# Patient Record
Sex: MALE | Race: WHITE | URBAN - METROPOLITAN AREA
[De-identification: names, ages, dates, MRNs, and addresses within clinical notes are randomized per-mention and may not be internally consistent; named-entity substitution may affect disease eponyms.]

---

## 2020-07-15 ENCOUNTER — INPATIENT (INPATIENT)
Facility: HOSPITAL | Age: 40
LOS: 1 days | Discharge: HOME | DRG: 637 | End: 2020-07-17
Attending: NURSE PRACTITIONER | Admitting: INTERNAL MEDICINE
Payer: COMMERCIAL

## 2020-07-15 VITALS
DIASTOLIC BLOOD PRESSURE: 80 MMHG | TEMPERATURE: 99 F | SYSTOLIC BLOOD PRESSURE: 132 MMHG | OXYGEN SATURATION: 100 % | HEIGHT: 69 IN | WEIGHT: 134.92 LBS | HEART RATE: 83 BPM

## 2020-07-15 DIAGNOSIS — Z90.89 ACQUIRED ABSENCE OF OTHER ORGANS: Chronic | ICD-10-CM

## 2020-07-15 DIAGNOSIS — E11.9 TYPE 2 DIABETES MELLITUS WITHOUT COMPLICATIONS: ICD-10-CM

## 2020-07-15 LAB
ACETONE SERPL-MCNC: ABNORMAL
ALBUMIN SERPL ELPH-MCNC: 3.4 G/DL — SIGNIFICANT CHANGE UP (ref 3.3–5)
ALP SERPL-CCNC: 139 U/L — HIGH (ref 40–120)
ALT FLD-CCNC: 72 U/L — SIGNIFICANT CHANGE UP (ref 12–78)
ANION GAP SERPL CALC-SCNC: 8 MMOL/L — SIGNIFICANT CHANGE UP (ref 5–17)
APPEARANCE UR: CLEAR — SIGNIFICANT CHANGE UP
APTT BLD: 25.3 SEC — LOW (ref 27.5–35.5)
AST SERPL-CCNC: 26 U/L — SIGNIFICANT CHANGE UP (ref 15–37)
BASE EXCESS BLDV CALC-SCNC: -1.5 MMOL/L — SIGNIFICANT CHANGE UP (ref -2–2)
BASOPHILS # BLD AUTO: 0.06 K/UL — SIGNIFICANT CHANGE UP (ref 0–0.2)
BASOPHILS NFR BLD AUTO: 0.9 % — SIGNIFICANT CHANGE UP (ref 0–2)
BILIRUB SERPL-MCNC: 0.5 MG/DL — SIGNIFICANT CHANGE UP (ref 0.2–1.2)
BILIRUB UR-MCNC: NEGATIVE — SIGNIFICANT CHANGE UP
BUN SERPL-MCNC: 20 MG/DL — SIGNIFICANT CHANGE UP (ref 7–23)
CALCIUM SERPL-MCNC: 8.5 MG/DL — SIGNIFICANT CHANGE UP (ref 8.5–10.1)
CHLORIDE SERPL-SCNC: 97 MMOL/L — SIGNIFICANT CHANGE UP (ref 96–108)
CO2 SERPL-SCNC: 24 MMOL/L — SIGNIFICANT CHANGE UP (ref 22–31)
COLOR SPEC: YELLOW — SIGNIFICANT CHANGE UP
CREAT SERPL-MCNC: 0.95 MG/DL — SIGNIFICANT CHANGE UP (ref 0.5–1.3)
DIFF PNL FLD: NEGATIVE — SIGNIFICANT CHANGE UP
EOSINOPHIL # BLD AUTO: 0.11 K/UL — SIGNIFICANT CHANGE UP (ref 0–0.5)
EOSINOPHIL NFR BLD AUTO: 1.6 % — SIGNIFICANT CHANGE UP (ref 0–6)
GLUCOSE SERPL-MCNC: 590 MG/DL — CRITICAL HIGH (ref 70–99)
GLUCOSE UR QL: 1000 MG/DL
HCO3 BLDV-SCNC: 24 MMOL/L — SIGNIFICANT CHANGE UP (ref 21–29)
HCT VFR BLD CALC: 37.3 % — LOW (ref 39–50)
HGB BLD-MCNC: 13.3 G/DL — SIGNIFICANT CHANGE UP (ref 13–17)
IMM GRANULOCYTES NFR BLD AUTO: 0.3 % — SIGNIFICANT CHANGE UP (ref 0–1.5)
INR BLD: 0.83 RATIO — LOW (ref 0.88–1.16)
KETONES UR-MCNC: ABNORMAL
LEUKOCYTE ESTERASE UR-ACNC: NEGATIVE — SIGNIFICANT CHANGE UP
LYMPHOCYTES # BLD AUTO: 1.72 K/UL — SIGNIFICANT CHANGE UP (ref 1–3.3)
LYMPHOCYTES # BLD AUTO: 25.7 % — SIGNIFICANT CHANGE UP (ref 13–44)
MCHC RBC-ENTMCNC: 29.8 PG — SIGNIFICANT CHANGE UP (ref 27–34)
MCHC RBC-ENTMCNC: 35.7 GM/DL — SIGNIFICANT CHANGE UP (ref 32–36)
MCV RBC AUTO: 83.6 FL — SIGNIFICANT CHANGE UP (ref 80–100)
MONOCYTES # BLD AUTO: 0.34 K/UL — SIGNIFICANT CHANGE UP (ref 0–0.9)
MONOCYTES NFR BLD AUTO: 5.1 % — SIGNIFICANT CHANGE UP (ref 2–14)
NEUTROPHILS # BLD AUTO: 4.43 K/UL — SIGNIFICANT CHANGE UP (ref 1.8–7.4)
NEUTROPHILS NFR BLD AUTO: 66.4 % — SIGNIFICANT CHANGE UP (ref 43–77)
NITRITE UR-MCNC: NEGATIVE — SIGNIFICANT CHANGE UP
PCO2 BLDV: 44 MMHG — SIGNIFICANT CHANGE UP (ref 35–50)
PH BLDV: 7.35 — SIGNIFICANT CHANGE UP (ref 7.35–7.45)
PH UR: 5 — SIGNIFICANT CHANGE UP (ref 5–8)
PLATELET # BLD AUTO: 294 K/UL — SIGNIFICANT CHANGE UP (ref 150–400)
PO2 BLDV: 60 MMHG — HIGH (ref 25–45)
POTASSIUM SERPL-MCNC: 4.1 MMOL/L — SIGNIFICANT CHANGE UP (ref 3.5–5.3)
POTASSIUM SERPL-SCNC: 4.1 MMOL/L — SIGNIFICANT CHANGE UP (ref 3.5–5.3)
PROT SERPL-MCNC: 6.4 GM/DL — SIGNIFICANT CHANGE UP (ref 6–8.3)
PROT UR-MCNC: NEGATIVE MG/DL — SIGNIFICANT CHANGE UP
PROTHROM AB SERPL-ACNC: 9.8 SEC — LOW (ref 10.6–13.6)
RBC # BLD: 4.46 M/UL — SIGNIFICANT CHANGE UP (ref 4.2–5.8)
RBC # FLD: 11.9 % — SIGNIFICANT CHANGE UP (ref 10.3–14.5)
SAO2 % BLDV: 91 % — HIGH (ref 67–88)
SODIUM SERPL-SCNC: 129 MMOL/L — LOW (ref 135–145)
SP GR SPEC: 1.01 — SIGNIFICANT CHANGE UP (ref 1.01–1.02)
UROBILINOGEN FLD QL: NEGATIVE MG/DL — SIGNIFICANT CHANGE UP
WBC # BLD: 6.68 K/UL — SIGNIFICANT CHANGE UP (ref 3.8–10.5)
WBC # FLD AUTO: 6.68 K/UL — SIGNIFICANT CHANGE UP (ref 3.8–10.5)

## 2020-07-15 PROCEDURE — 84100 ASSAY OF PHOSPHORUS: CPT

## 2020-07-15 PROCEDURE — 71045 X-RAY EXAM CHEST 1 VIEW: CPT | Mod: 26

## 2020-07-15 PROCEDURE — 86341 ISLET CELL ANTIBODY: CPT

## 2020-07-15 PROCEDURE — 83036 HEMOGLOBIN GLYCOSYLATED A1C: CPT

## 2020-07-15 PROCEDURE — 82962 GLUCOSE BLOOD TEST: CPT

## 2020-07-15 PROCEDURE — 84681 ASSAY OF C-PEPTIDE: CPT

## 2020-07-15 PROCEDURE — 36415 COLL VENOUS BLD VENIPUNCTURE: CPT

## 2020-07-15 PROCEDURE — 83519 RIA NONANTIBODY: CPT

## 2020-07-15 PROCEDURE — 80053 COMPREHEN METABOLIC PANEL: CPT

## 2020-07-15 PROCEDURE — 83735 ASSAY OF MAGNESIUM: CPT

## 2020-07-15 PROCEDURE — 85027 COMPLETE CBC AUTOMATED: CPT

## 2020-07-15 PROCEDURE — 99222 1ST HOSP IP/OBS MODERATE 55: CPT | Mod: AI

## 2020-07-15 PROCEDURE — 93010 ELECTROCARDIOGRAM REPORT: CPT

## 2020-07-15 PROCEDURE — 80048 BASIC METABOLIC PNL TOTAL CA: CPT

## 2020-07-15 PROCEDURE — 80061 LIPID PANEL: CPT

## 2020-07-15 RX ORDER — DEXTROSE 50 % IN WATER 50 %
15 SYRINGE (ML) INTRAVENOUS ONCE
Refills: 0 | Status: DISCONTINUED | OUTPATIENT
Start: 2020-07-15 | End: 2020-07-17

## 2020-07-15 RX ORDER — INSULIN LISPRO 100/ML
VIAL (ML) SUBCUTANEOUS AT BEDTIME
Refills: 0 | Status: DISCONTINUED | OUTPATIENT
Start: 2020-07-15 | End: 2020-07-17

## 2020-07-15 RX ORDER — DEXTROSE 50 % IN WATER 50 %
12.5 SYRINGE (ML) INTRAVENOUS ONCE
Refills: 0 | Status: DISCONTINUED | OUTPATIENT
Start: 2020-07-15 | End: 2020-07-17

## 2020-07-15 RX ORDER — SODIUM CHLORIDE 9 MG/ML
1000 INJECTION INTRAMUSCULAR; INTRAVENOUS; SUBCUTANEOUS
Refills: 0 | Status: DISCONTINUED | OUTPATIENT
Start: 2020-07-15 | End: 2020-07-17

## 2020-07-15 RX ORDER — INSULIN LISPRO 100/ML
VIAL (ML) SUBCUTANEOUS
Refills: 0 | Status: DISCONTINUED | OUTPATIENT
Start: 2020-07-15 | End: 2020-07-17

## 2020-07-15 RX ORDER — DEXTROSE 50 % IN WATER 50 %
25 SYRINGE (ML) INTRAVENOUS ONCE
Refills: 0 | Status: DISCONTINUED | OUTPATIENT
Start: 2020-07-15 | End: 2020-07-17

## 2020-07-15 RX ORDER — INSULIN GLARGINE 100 [IU]/ML
15 INJECTION, SOLUTION SUBCUTANEOUS AT BEDTIME
Refills: 0 | Status: DISCONTINUED | OUTPATIENT
Start: 2020-07-15 | End: 2020-07-17

## 2020-07-15 RX ORDER — BUPROPION HYDROCHLORIDE 150 MG/1
300 TABLET, EXTENDED RELEASE ORAL DAILY
Refills: 0 | Status: DISCONTINUED | OUTPATIENT
Start: 2020-07-15 | End: 2020-07-15

## 2020-07-15 RX ORDER — BUPROPION HYDROCHLORIDE 150 MG/1
1 TABLET, EXTENDED RELEASE ORAL
Qty: 0 | Refills: 0 | DISCHARGE

## 2020-07-15 RX ORDER — BUPROPION HYDROCHLORIDE 150 MG/1
300 TABLET, EXTENDED RELEASE ORAL AT BEDTIME
Refills: 0 | Status: DISCONTINUED | OUTPATIENT
Start: 2020-07-15 | End: 2020-07-16

## 2020-07-15 RX ORDER — SODIUM CHLORIDE 9 MG/ML
2000 INJECTION INTRAMUSCULAR; INTRAVENOUS; SUBCUTANEOUS ONCE
Refills: 0 | Status: COMPLETED | OUTPATIENT
Start: 2020-07-15 | End: 2020-07-15

## 2020-07-15 RX ORDER — INSULIN LISPRO 100/ML
7 VIAL (ML) SUBCUTANEOUS ONCE
Refills: 0 | Status: COMPLETED | OUTPATIENT
Start: 2020-07-15 | End: 2020-07-15

## 2020-07-15 RX ORDER — INSULIN LISPRO 100/ML
5 VIAL (ML) SUBCUTANEOUS
Refills: 0 | Status: DISCONTINUED | OUTPATIENT
Start: 2020-07-15 | End: 2020-07-17

## 2020-07-15 RX ORDER — QUETIAPINE FUMARATE 200 MG/1
100 TABLET, FILM COATED ORAL AT BEDTIME
Refills: 0 | Status: DISCONTINUED | OUTPATIENT
Start: 2020-07-15 | End: 2020-07-17

## 2020-07-15 RX ORDER — GLUCAGON INJECTION, SOLUTION 0.5 MG/.1ML
1 INJECTION, SOLUTION SUBCUTANEOUS ONCE
Refills: 0 | Status: DISCONTINUED | OUTPATIENT
Start: 2020-07-15 | End: 2020-07-17

## 2020-07-15 RX ADMIN — INSULIN GLARGINE 15 UNIT(S): 100 INJECTION, SOLUTION SUBCUTANEOUS at 23:20

## 2020-07-15 RX ADMIN — SODIUM CHLORIDE 100 MILLILITER(S): 9 INJECTION INTRAMUSCULAR; INTRAVENOUS; SUBCUTANEOUS at 22:52

## 2020-07-15 RX ADMIN — Medication 7 UNIT(S): at 20:01

## 2020-07-15 RX ADMIN — QUETIAPINE FUMARATE 100 MILLIGRAM(S): 200 TABLET, FILM COATED ORAL at 22:51

## 2020-07-15 RX ADMIN — SODIUM CHLORIDE 1000 MILLILITER(S): 9 INJECTION INTRAMUSCULAR; INTRAVENOUS; SUBCUTANEOUS at 18:45

## 2020-07-15 NOTE — ED STATDOCS - PROGRESS NOTE DETAILS
Tom DUNCAN for Dr. Nichole: 40 y/o male with a PMHx of Anxiety, Depression presents to the ED for hyperglycemia. In triage, finger stick is over 600. No hx of DM. Works for [employer] and 2 days ago felt signs of "heat exhaustion" with fatigue and excessive thirst. Yesterday had urinary frequency. +nausea x today. Went to , had glucose of 60 in urine and had ketones of 8. Pt lost 30 pounds in 2.5 months. Denies vomiting, diarrhea. Will send pt to main ED for further evaluation.

## 2020-07-15 NOTE — ED PROVIDER NOTE - CLINICAL SUMMARY MEDICAL DECISION MAKING FREE TEXT BOX
Hyperglycemia without DKA.  Given IVF, insulin.  Pt without PCP currently.  Admit to medicine service.

## 2020-07-15 NOTE — H&P ADULT - NSHPSOCIALHISTORY_GEN_ALL_CORE
Former smoker, 20 years, stopped 4 months ago.  Occasional binge drinking history, none recently.  No drug use.  Lives with his wife and son.  Works at [employer].

## 2020-07-15 NOTE — ED ADULT NURSE NOTE - OBJECTIVE STATEMENT
Pt to ED for hyperglycemia. In triage, finger stick is over 600. No hx of DM. Works for [employer] and 2 days ago felt signs of "heat exhaustion" with fatigue and excessive thirst. Yesterday had urinary frequency. +nausea x today. Went to UC, had glucose of 60 in urine and had ketones of 8. Pt lost 30 pounds in 2.5 months.

## 2020-07-15 NOTE — H&P ADULT - NSHPLABSRESULTS_GEN_ALL_CORE
Labs personally reviewed and interpreted. Notable for no leukocytosis (WBC 6.68), left shift, or lymphopenia. Hb 13.3, plt 294. INR 0.83, Na low at 129 due to pseudohyponatremia. K 4.1, Cl 97, HCO3 24, BUN/creatinine 20/0.95 (no prior baseline). BG elevated at 590. AG normal at 8. Alk phos minimally elevated at 139. AST/ALT normal at 26/72. VBG pH 7.35 with pCO2 44 and pO2 60. Small serum acetone.  UA shows SG 1.010, moderate ketones, and 1000 glucose.  COVID-19 PCR results pending.    CXR personally reviewed and interpreted. Notable for clear lungs, no focal consolidations, effusions, interstitial  markings, pneumothorax, or obvious cardiomegaly.    EKG personally reviewed. Normal sinus rhythm, normal axis. No pathological Q waves or ST changes. Rate 74, , QTc 459.

## 2020-07-15 NOTE — ED PROVIDER NOTE - OBJECTIVE STATEMENT
40 y/o male with a PMHx of Anxiety, Depression presents to the ED for hyperglycemia. In triage, finger stick is over 600. No hx of DM. Works for [employer] and 2 days ago felt signs of "heat exhaustion" with fatigue and excessive thirst. Yesterday had urinary frequency. +nausea x today. Went to , had glucose of 60 in urine and had ketones of 8. Pt lost 30 pounds in 2.5 months. Denies vomiting, diarrhea. Will send pt to main ED for further evaluation.

## 2020-07-15 NOTE — H&P ADULT - HISTORY OF PRESENT ILLNESS
38 yo M with a PMH anxiety with depression who presents with multiple complaints. He has been feeling very thirsty with polyuria and a 30 pound weight loss for the past 3 months. He has had weakness and myalgias and has had difficulty and fatigue lifting things at work. ROS is also positive for an episode of chills a week ago, and some nausea, but otherwise is negative. He stopped his Wellbutrin and Seroquel 2 days ago because he thought his symptoms might be medication induced. He went to Urgent Care today and was found to have glucose in his urine, with concern for diabetes, and was recommended to come to the ED.  The patient acknowledges a poor diet, with little vegetable intake, eating lots of candy, and soda use (2L at dinner on occasion, although has stopped soda recently). Of note, he also was started on Seroquel about 4 months ago.    In the ED, he was given 2L NS x1 and Humalog 7 units SQ x1.

## 2020-07-15 NOTE — ED ADULT NURSE NOTE - CHPI ED NUR SYMPTOMS NEG
no tingling/no chills/no vomiting/no fever/no weakness/no decreased eating/drinking/no pain/no nausea/no dizziness

## 2020-07-15 NOTE — H&P ADULT - ASSESSMENT
40 yo M with a PMH anxiety with depression who presents with multiple complaints, found to have new onset diabetes mellitus.    1) New onset diabetes mellitus  - Likely due to poor diet, although may be complicated by Seroquel use  - With pseudohyponatremia due to elevated BG  - With slight serum acetone but no significant evidence of DKA  - _________  - Check A1C and lipid panel  - Endocrinology consult  - Discussed with patient that when he is discharged, he may go home on insulin vs PO medication and that he would be educated appropriately    2) Anxiety with depression  - Will continue Bupropion 300 mg QHS and Quetiapine 100 mg QHS  - Discussed how patient should talk with his psychiatrist about possibly changing Quetiapine in light of new diabetes    3) Prophylactic measure  - DVT PPX: IMPROVE score of 0, low risk, no pharmacological PPX needed, will give SCDs  - Diet: consistent carb  - Dispo: pending sugar control and endocrinology matthias 40 yo M with a PMH anxiety with depression who presents with multiple complaints, found to have new onset uncontrolled diabetes mellitus.    1) Uncontrolled Diabetes Mellitus  - New onset diabetes mellitus. Likely due to poor diet, although may be complicated by Seroquel use  - With pseudohyponatremia due to elevated BG  - With slight serum acetone but no significant evidence of DKA  - S/p 7 units Humalog  - Please obtain weight and will provide weight based insulin dosing with basal/bolus for now  - Check FS Q4 hours until FS < 300 x2, then switch to QAC + HS, with moderate SSI  - Check A1C and lipid panel  - Endocrinology consult  - Discussed with patient that when he is discharged, he may go home on insulin vs PO medication and that he would be educated appropriately    2) Anxiety with depression  - Will continue Bupropion 300 mg QHS and Quetiapine 100 mg QHS  - Discussed how patient should talk with his psychiatrist about possibly changing Quetiapine in light of new diabetes    3) Prophylactic measure  - DVT PPX: IMPROVE score of 0, low risk, no pharmacological PPX needed, will give SCDs  - Diet: consistent carb  - Dispo: pending sugar control and endocrinology matthias 38 yo M with a PMH anxiety with depression who presents with multiple complaints, found to have new onset uncontrolled diabetes mellitus.    1) Uncontrolled Diabetes Mellitus  - New onset diabetes mellitus. Likely due to poor diet, although may be complicated by recent Seroquel use  - With pseudohyponatremia due to elevated BG  - With slight serum acetone but no significant evidence of DKA  - S/p 7 units Humalog  - Will give weight based basal/bolus dosing with Lantus 15 units QHS, and standing premeal Humalog 5 units QAC  - Check FS Q4 hours until FS < 300 x2, then switch to QAC + HS, with moderate SSI  - Check A1C and lipid panel  - Endocrinology consult  - Discussed with patient that when he is discharged, he may go home on insulin vs PO medication and that he would be educated appropriately    2) Anxiety with depression  - Will continue Bupropion 300 mg QHS and Quetiapine 100 mg QHS  - Discussed how patient should talk with his psychiatrist about possibly changing Quetiapine in light of new diabetes    3) Prophylactic measure  - DVT PPX: IMPROVE score of 0, low risk, no pharmacological PPX needed, will give SCDs  - Diet: consistent carb  - Dispo: pending sugar control and endocrinology matthias 40 yo M with a PMH anxiety with depression who presents with multiple complaints, found to have new onset uncontrolled diabetes mellitus.    1) Uncontrolled Diabetes Mellitus  - New onset diabetes mellitus. Likely due to poor diet, although may be complicated by recent Seroquel use  - With pseudohyponatremia due to elevated BG  - With slight serum acetone but no significant evidence of DKA  - S/p 7 units Humalog  - Will give weight based basal/bolus dosing with Lantus 15 units QHS, and standing premeal Humalog 5 units QAC  - Check FS Q4 hours until FS < 300 x2, then switch to QAC + HS, with moderate SSI  - IV hydration  - Check A1C and lipid panel  - Endocrinology consult  - Discussed with patient that when he is discharged, he may go home on insulin vs PO medication and that he would be educated appropriately    2) Anxiety with depression  - Will continue Bupropion 300 mg QHS and Quetiapine 100 mg QHS  - Discussed how patient should talk with his psychiatrist about possibly changing Quetiapine in light of new diabetes    3) Prophylactic measure  - DVT PPX: IMPROVE score of 0, low risk, no pharmacological PPX needed, will give SCDs  - Diet: consistent carb  - Dispo: pending sugar control and endocrinology matthias

## 2020-07-15 NOTE — ED ADULT NURSE NOTE - NSIMPLEMENTINTERV_GEN_ALL_ED
Implemented All Universal Safety Interventions:  Hannah to call system. Call bell, personal items and telephone within reach. Instruct patient to call for assistance. Room bathroom lighting operational. Non-slip footwear when patient is off stretcher. Physically safe environment: no spills, clutter or unnecessary equipment. Stretcher in lowest position, wheels locked, appropriate side rails in place.

## 2020-07-15 NOTE — H&P ADULT - NSHPPHYSICALEXAM_GEN_ALL_CORE
Vital Signs Last 24 Hrs  T(C): 36.8 (15 Jul 2020 19:48), Max: 37.2 (15 Jul 2020 17:47)  T(F): 98.3 (15 Jul 2020 19:48), Max: 98.9 (15 Jul 2020 17:47)  HR: 65 (15 Jul 2020 19:48) (65 - 83)  BP: 119/73 (15 Jul 2020 19:48) (119/73 - 132/80)  BP(mean): 87 (15 Jul 2020 19:48) (87 - 95)  RR: 18 (15 Jul 2020 19:48) (18 - 18)  SpO2: 100% (15 Jul 2020 19:48) (100% - 100%)    GENERAL: No acute distress, borderline cachectic appearing  HEENT: PERRL, EOMI, MM very dry, no oropharyngeal lesions  NECK: supple, no stiffness, no JVD, no thyromegaly  PULM: respirations non-labored, clear to auscultation bilaterally, no rales, rhonchi, or wheezes  CV: regular rate and rhythm, no murmurs, gallops, or rubs  GI: abdomen soft, nontender, nondistended, no masses felt, normal bowel sounds  MSK: strength 5/5 bilateral upper/lower extremities. No joint swelling, erythema, or warmth.  LYMPH: no anterior cervical, posterior cervical, supraclavicular, or inguinal lymphadenopathy  NEURO: A&Ox3, no tremors, sensation intact  SKIN: no rashes, lesions, or edema

## 2020-07-15 NOTE — H&P ADULT - NSHPREVIEWOFSYSTEMS_GEN_ALL_CORE
Gen: + chills, weight loss, malaise, fatigue. Negative for fevers or weight gain  Eyes: no blurred vision or lacrimation  ENT: no tinnitus, vertigo, or decreased hearing  Resp: no wheezing, dyspnea, pleuritic chest pain, hemoptysis, or orthopnea  CV: + FRY. No chest pain or palpitations  GI: + nausea. No vomiting, abdominal pain, diarrhea, constipation, melena, or hematochezia  : no dysuria, hematuria, or incontinence  MSK: + myalgias. No arthralgias or joint swelling  Neuro: + weakness. No focal deficits, confusion, tremors, or seizures  Skin: no rash, lesions, or edema

## 2020-07-16 LAB
ALBUMIN SERPL ELPH-MCNC: 2.9 G/DL — LOW (ref 3.3–5)
ALP SERPL-CCNC: 70 U/L — SIGNIFICANT CHANGE UP (ref 40–120)
ALT FLD-CCNC: 60 U/L — SIGNIFICANT CHANGE UP (ref 12–78)
ANION GAP SERPL CALC-SCNC: 6 MMOL/L — SIGNIFICANT CHANGE UP (ref 5–17)
AST SERPL-CCNC: 27 U/L — SIGNIFICANT CHANGE UP (ref 15–37)
BILIRUB SERPL-MCNC: 0.6 MG/DL — SIGNIFICANT CHANGE UP (ref 0.2–1.2)
BUN SERPL-MCNC: 12 MG/DL — SIGNIFICANT CHANGE UP (ref 7–23)
C PEPTIDE SERPL-MCNC: 0.5 NG/ML — LOW (ref 1.1–4.4)
CALCIUM SERPL-MCNC: 8 MG/DL — LOW (ref 8.5–10.1)
CHLORIDE SERPL-SCNC: 111 MMOL/L — HIGH (ref 96–108)
CHOLEST SERPL-MCNC: 145 MG/DL — SIGNIFICANT CHANGE UP (ref 10–199)
CO2 SERPL-SCNC: 25 MMOL/L — SIGNIFICANT CHANGE UP (ref 22–31)
CREAT SERPL-MCNC: 0.7 MG/DL — SIGNIFICANT CHANGE UP (ref 0.5–1.3)
CULTURE RESULTS: NO GROWTH — SIGNIFICANT CHANGE UP
GLUCOSE SERPL-MCNC: 153 MG/DL — HIGH (ref 70–99)
HCT VFR BLD CALC: 37.6 % — LOW (ref 39–50)
HDLC SERPL-MCNC: 36 MG/DL — LOW
HGB BLD-MCNC: 13.3 G/DL — SIGNIFICANT CHANGE UP (ref 13–17)
LIPID PNL WITH DIRECT LDL SERPL: 83 MG/DL — SIGNIFICANT CHANGE UP
MAGNESIUM SERPL-MCNC: 1.9 MG/DL — SIGNIFICANT CHANGE UP (ref 1.6–2.6)
MCHC RBC-ENTMCNC: 30.3 PG — SIGNIFICANT CHANGE UP (ref 27–34)
MCHC RBC-ENTMCNC: 35.4 GM/DL — SIGNIFICANT CHANGE UP (ref 32–36)
MCV RBC AUTO: 85.6 FL — SIGNIFICANT CHANGE UP (ref 80–100)
PHOSPHATE SERPL-MCNC: 2.9 MG/DL — SIGNIFICANT CHANGE UP (ref 2.5–4.5)
PLATELET # BLD AUTO: 271 K/UL — SIGNIFICANT CHANGE UP (ref 150–400)
POTASSIUM SERPL-MCNC: 3.6 MMOL/L — SIGNIFICANT CHANGE UP (ref 3.5–5.3)
POTASSIUM SERPL-SCNC: 3.6 MMOL/L — SIGNIFICANT CHANGE UP (ref 3.5–5.3)
PROT SERPL-MCNC: 5.2 GM/DL — LOW (ref 6–8.3)
RBC # BLD: 4.39 M/UL — SIGNIFICANT CHANGE UP (ref 4.2–5.8)
RBC # FLD: 11.9 % — SIGNIFICANT CHANGE UP (ref 10.3–14.5)
SARS-COV-2 IGG SERPL QL IA: NEGATIVE — SIGNIFICANT CHANGE UP
SARS-COV-2 IGM SERPL IA-ACNC: <0.1 INDEX — SIGNIFICANT CHANGE UP
SARS-COV-2 RNA SPEC QL NAA+PROBE: SIGNIFICANT CHANGE UP
SODIUM SERPL-SCNC: 142 MMOL/L — SIGNIFICANT CHANGE UP (ref 135–145)
SPECIMEN SOURCE: SIGNIFICANT CHANGE UP
TOTAL CHOLESTEROL/HDL RATIO MEASUREMENT: 4.1 RATIO — SIGNIFICANT CHANGE UP (ref 3.4–9.6)
TRIGL SERPL-MCNC: 133 MG/DL — SIGNIFICANT CHANGE UP (ref 10–149)
WBC # BLD: 7.66 K/UL — SIGNIFICANT CHANGE UP (ref 3.8–10.5)
WBC # FLD AUTO: 7.66 K/UL — SIGNIFICANT CHANGE UP (ref 3.8–10.5)

## 2020-07-16 PROCEDURE — 99233 SBSQ HOSP IP/OBS HIGH 50: CPT

## 2020-07-16 RX ORDER — BUPROPION HYDROCHLORIDE 150 MG/1
300 TABLET, EXTENDED RELEASE ORAL DAILY
Refills: 0 | Status: DISCONTINUED | OUTPATIENT
Start: 2020-07-16 | End: 2020-07-17

## 2020-07-16 RX ADMIN — Medication 5 UNIT(S): at 17:03

## 2020-07-16 RX ADMIN — QUETIAPINE FUMARATE 100 MILLIGRAM(S): 200 TABLET, FILM COATED ORAL at 22:08

## 2020-07-16 RX ADMIN — Medication 5 UNIT(S): at 08:32

## 2020-07-16 RX ADMIN — Medication 5 UNIT(S): at 11:59

## 2020-07-16 RX ADMIN — Medication 2: at 08:31

## 2020-07-16 RX ADMIN — Medication 6: at 12:00

## 2020-07-16 RX ADMIN — INSULIN GLARGINE 15 UNIT(S): 100 INJECTION, SOLUTION SUBCUTANEOUS at 22:08

## 2020-07-16 NOTE — DIETITIAN INITIAL EVALUATION ADULT. - OTHER INFO
38 yo M with a PMH anxiety with depression who presents with multiple complaints. He has been feeling very thirsty with polyuria and a 30 pound weight loss for the past 3 months. He has had weakness and myalgias and has had difficulty and fatigue lifting things at work. ROS is also positive for an episode of chills a week ago, and some nausea, but otherwise is negative.  He went to Urgent Care today and was found to have glucose in his urine, with concern for diabetes, and was recommended to come to the ED.  The patient acknowledges a poor diet, with little vegetable intake, eating lots of candy, and soda use (2L at dinner on occasion, although has stopped soda recently). Of note, he also was started on Seroquel about 4 months ago.  ) Uncontrolled Diabetes Mellitus  - New onset diabetes mellitus. Likely due to poor diet, although may be complicated by recent Seroquel use  - With pseudohyponatremia due to elevated BG  - With slight serum acetone but no significant evidence of DKA  - S/p 7 units Humalog  - Will give weight based basal/bolus dosing with Lantus 15 units QHS, and standing premeal Humalog 5 units QAC  - Check FS Q4 hours until FS < 300 x2, then switch to QAC + HS, with moderate SSI  - IV hydration  - Check A1C and lipid panel- pending  - Endocrinology consult  - Diabetes edcuation 40 yo M with a PMH anxiety with depression who presents with multiple complaints. He has been feeling very thirsty with polyuria and a 30 pound weight loss for the past 3 months. He has had weakness and myalgias and has had difficulty and fatigue lifting things at work. ROS is also positive for an episode of chills a week ago, and some nausea, but otherwise is negative.  He went to Urgent Care today and was found to have glucose in his urine, with concern for diabetes, and was recommended to come to the ED.  The patient acknowledges a poor diet, with little vegetable intake, eating lots of candy, and soda use (2L at dinner on occasion, although has stopped soda recently). Of note, he also was started on Seroquel about 4 months ago.  ) Uncontrolled Diabetes Mellitus  - New onset diabetes mellitus. Likely due to poor diet, although may be complicated by recent Seroquel use  - With pseudohyponatremia due to elevated BG  - With slight serum acetone but no significant evidence of DKA  - S/p 7 units Humalog  - Will give weight based basal/bolus dosing with Lantus 15 units QHS, and standing premeal Humalog 5 units QAC  - Check FS Q4 hours until FS < 300 x2, then switch to QAC + HS, with moderate SSI  - IV hydration  - Check A1C and lipid panel- pending  - Endocrinology consult  - Diabetes education  Stopped into room to see pt, but pt was asleep.  Nursing reports that pt was awake most of night.  Will return to perform NFPE, and to educate PT on DM self-management 40 yo M with a PMH anxiety with depression who presents with multiple complaints. He has been feeling very thirsty with polyuria and a 30 pound weight loss for the past 3 months. He has had weakness and myalgias and has had difficulty and fatigue lifting things at work. ROS is also positive for an episode of chills a week ago, and some nausea, but otherwise is negative.  He went to Urgent Care today and was found to have glucose in his urine, with concern for diabetes, and was recommended to come to the ED.  The patient acknowledges a poor diet, with little vegetable intake, eating lots of candy, and soda use (2L at dinner on occasion, although has stopped soda recently). Of note, he also was started on Seroquel about 4 months ago.  ) Uncontrolled Diabetes Mellitus  - New onset diabetes mellitus. Likely due to poor diet, although may be complicated by recent Seroquel use  - With pseudohyponatremia due to elevated BG  - With slight serum acetone but no significant evidence of DKA  - S/p 7 units Humalog  Spoke with pt, who reports that he had lost weight, but attributed it to psych meds.  Pt educated on carbohydrate counting, healthful food choices, reducing sugary drinks.    - Will give weight based basal/bolus dosing with Lantus 15 units QHS, and standing premeal Humalog 5 units QAC  - Check FS Q4 hours until FS < 300 x2, then switch to QAC + HS, with moderate SSI  - IV hydration  - Check A1C and lipid panel- pending  - Endocrinology consult  - Diabetes education  Stopped into room to see pt, but pt was asleep.  Nursing reports that pt was awake most of night.  Will return to perform NFPE, and to educate PT on DM self-management

## 2020-07-16 NOTE — DIETITIAN INITIAL EVALUATION ADULT. - ADD RECOMMEND
Record PO intake in EMR after each meal (nursing.) DM2 education.  Check HgbA1c. Return for NFPE. Monitor PO intake, tolerance, labs and weight. Record PO intake in EMR after each meal (nursing.) DM2 education.  Check HgbA1c. . Monitor PO intake, tolerance, labs and weight.

## 2020-07-16 NOTE — PROGRESS NOTE ADULT - SUBJECTIVE AND OBJECTIVE BOX
40 yo M with a PMH anxiety with depression who presents with feeling very thirsty with polyuria and a 30 pound weight loss for the past 3 months. He has had weakness and myalgias and has had difficulty and fatigue lifting things at work. ROS is also positive for an episode of chills a week ago, and some nausea, but otherwise is negative. He stopped his Wellbutrin and Seroquel 2 days ago because he thought his symptoms might be medication induced. He went to Urgent Care today and was found to have glucose in his urine, with concern for diabetes, and was recommended to come to the ED.  The patient acknowledges a poor diet, with little vegetable intake, eating lots of candy, and soda use (2L at dinner on occasion, although has stopped soda recently). Of note, he also was started on Seroquel about 4 months ago.  Admission BGM >600, small serum acetone and hyponatremia- IVF and insulin started. A1c - [facility]    7/16- seen and examined. Wants to go home- no acute overnight events.    Vital Signs Last 24 Hrs  T(C): 36.6 (16 Jul 2020 08:25), Max: 37.4 (15 Jul 2020 21:40)  T(F): 97.9 (16 Jul 2020 08:25), Max: 99.4 (15 Jul 2020 21:40)  HR: 64 (16 Jul 2020 08:25) (62 - 83)  BP: 111/68 (16 Jul 2020 08:25) (111/68 - 132/80)  BP(mean): 87 (15 Jul 2020 19:48) (87 - 95)  RR: 18 (16 Jul 2020 08:25) (18 - 18)  SpO2: 100% (16 Jul 2020 08:25) (100% - 100%)    REVIEW OF SYSTEMS:    CONSTITUTIONAL: No weakness, fevers or chills  EYES/ENT: No visual changes;  No vertigo or throat pain   NECK: No pain or stiffness  RESPIRATORY: No cough, wheezing, hemoptysis; No shortness of breath  CARDIOVASCULAR: No chest pain or palpitations  GASTROINTESTINAL: No abdominal or epigastric pain. No nausea, vomiting, or hematemesis; No diarrhea or constipation. No melena or hematochezia.  GENITOURINARY: No dysuria, frequency or hematuria  NEUROLOGICAL: No numbness or weakness  SKIN: No itching, burning, rashes, or lesions   All other review of systems is negative unless indicated above.    PE:  Constitutional: NAD, laying in bed  HEENT: NC/AT  Back: no tenderness  Respiratory: respirations even and non labored, LCTA  Cardiovascular: S1S2 regular, no murmurs  Abdomen: soft, not tender, not distended, positive BS  Genitourinary: voiding  Rectal: deferred  Musculoskeletal: no muscle tenderness, no joint swelling or tenderness  Extremities: no pedal edema   Neurological: no focal deficits    07-16    142  |  111<H>  |  12  ----------------------------<  153<H>  3.6   |  25  |  0.70    Ca    8.0<L>      16 Jul 2020 08:12  Phos  2.9     07-16  Mg     1.9     07-16    TPro  5.2<L>  /  Alb  2.9<L>  /  TBili  0.6  /  DBili  x   /  AST  27  /  ALT  60  /  AlkPhos  70  07-16                        13.3   7.66  )-----------( 271      ( 16 Jul 2020 08:12 )             37.6

## 2020-07-16 NOTE — DIETITIAN INITIAL EVALUATION ADULT. - ENERGY NEEDS
Ht.  69    "        Wt.     61.2   kg               BMI      19.9            IBW   73    kg               Pt is at  84  %  IBW

## 2020-07-16 NOTE — DIETITIAN INITIAL EVALUATION ADULT. - MALNUTRITION
Pt meets criteria for severe protein-calorie malnutrition in context of chronic disease.  PT reports loss of 18% UBW in 3 months, PO intake < 75% nutritional needs > one month. Pt meets criteria for severe protein-calorie malnutrition in context of chronic disease.  PT reports loss of 18% UBW in 3 months, PO intake < 75% nutritional needs > one month. NFPE reveals severe fat wasting (triceps, orbital area.)  Moderate/severe muscle wasting (clavicles, shoulders, scapula, calves, thighs).

## 2020-07-16 NOTE — DIETITIAN INITIAL EVALUATION ADULT. - PERTINENT MEDS FT
MEDICATIONS  (STANDING):  buPROPion XL . 300 milliGRAM(s) Oral daily  dextrose 50% Injectable 12.5 Gram(s) IV Push once  dextrose 50% Injectable 25 Gram(s) IV Push once  dextrose 50% Injectable 25 Gram(s) IV Push once  insulin glargine Injectable (LANTUS) 15 Unit(s) SubCutaneous at bedtime  insulin lispro (HumaLOG) corrective regimen sliding scale   SubCutaneous three times a day before meals  insulin lispro (HumaLOG) corrective regimen sliding scale   SubCutaneous at bedtime  insulin lispro Injectable (HumaLOG) 5 Unit(s) SubCutaneous three times a day before meals  QUEtiapine 100 milliGRAM(s) Oral at bedtime  sodium chloride 0.9%. 1000 milliLiter(s) (100 mL/Hr) IV Continuous <Continuous>    MEDICATIONS  (PRN):  dextrose 40% Gel 15 Gram(s) Oral once PRN Blood Glucose LESS THAN 70 milliGRAM(s)/deciliter  glucagon  Injectable 1 milliGRAM(s) IntraMuscular once PRN Glucose LESS THAN 70 milligrams/deciliter

## 2020-07-16 NOTE — PROGRESS NOTE ADULT - ATTENDING COMMENTS
Patient seen and examined with RAMIN Gomez.  I was physically present for the key portions of the evaluation and management (E/M) service provided.  I agree with the above history, physical, and plan which I have reviewed and edited where appropriate.  - sugars better controlled  - monitor and adjust insulin and if well controlled for 24 hrs dc in AM

## 2020-07-16 NOTE — PROGRESS NOTE ADULT - ASSESSMENT
40 yo M with a PMH anxiety with depression who presents with multiple complaints, found to have new onset uncontrolled diabetes mellitus.    1) Uncontrolled Diabetes Mellitus  - New onset diabetes mellitus. Likely due to poor diet, although may be complicated by recent Seroquel use  - With pseudohyponatremia due to elevated BG  - With slight serum acetone but no significant evidence of DKA  - S/p 7 units Humalog  - Will give weight based basal/bolus dosing with Lantus 15 units QHS, and standing premeal Humalog 5 units QAC  - Check FS Q4 hours until FS < 300 x2, then switch to QAC + HS, with moderate SSI  - IV hydration  - Check A1C and lipid panel- pending  - Endocrinology consult  - Diabetes edcuation    2) Anxiety with depression  - Will continue Bupropion 300 mg  and Quetiapine 100 mg QHS  - Discussed how patient should talk with his psychiatrist about possibly changing Quetiapine in light of new diabetes    3) Prophylactic measure  - DVT PPX: IMPROVE score of 0, low risk, no pharmacological PPX needed, will give SCDs  - Diet: consistent carb  - Dispo: abby LING in 24 hours    Case d/w team and MD on IDR. Plan explained to patient and all of their concerns were addressed.

## 2020-07-16 NOTE — DIETITIAN INITIAL EVALUATION ADULT. - OBTAIN WEEKLY WEIGHT
[employer]. [address] [place] [address], [place] [address]. All rights reserved. This information is not intended as a substitute for professional medical care. Always follow your healthcare professional's instructions.         yes

## 2020-07-16 NOTE — DIETITIAN INITIAL EVALUATION ADULT. - PERTINENT LABORATORY DATA
07-16 Na142 mmol/L Glu 153 mg/dL<H> K+ 3.6 mmol/L Cr  0.70 mg/dL BUN 12 mg/dL Phos 2.9 mg/dL Alb 2.9 g/dL<L> PAB n/a

## 2020-07-16 NOTE — CONSULT NOTE ADULT - SUBJECTIVE AND OBJECTIVE BOX
HPI: 40 yo male, hx of anxiety with depression, who presented with 3 month hx of polyuria and polydipsia associated with 35 lb weight loss.  BMI is currently 19.   he states that he did have a change in medication recently for his depression and was started on Seroquel.  He has had occasional vision blurriness but denies any neuropathy.  He is unsure of his family history but denies any knowledge of diabetes.  He states that he has not been to a primary care doctor in quite some time prior to this event.    The patient acknowledges a poor diet with increased soda intake and his BG on admission was > 600.     Vital Signs Last 24 Hrs  T(C): 36.6 (16 Jul 2020 08:25), Max: 37.4 (15 Jul 2020 21:40)  T(F): 97.9 (16 Jul 2020 08:25), Max: 99.4 (15 Jul 2020 21:40)  HR: 64 (16 Jul 2020 08:25) (62 - 83)  BP: 111/68 (16 Jul 2020 08:25) (111/68 - 132/80)  BP(mean): 87 (15 Jul 2020 19:48) (87 - 95)  RR: 18 (16 Jul 2020 08:25) (18 - 18)  SpO2: 100% (16 Jul 2020 08:25) (100% - 100%)    REVIEW OF SYSTEMS:    CONSTITUTIONAL: No weakness, fevers or chills  EYES/ENT: No visual changes;  No vertigo or throat pain   NECK: No pain or stiffness  RESPIRATORY: No cough, wheezing, hemoptysis; No shortness of breath  CARDIOVASCULAR: No chest pain or palpitations  GASTROINTESTINAL: No abdominal or epigastric pain. No nausea, vomiting, or hematemesis; No diarrhea or constipation. No melena or hematochezia.  GENITOURINARY: No dysuria, frequency or hematuria  NEUROLOGICAL: No numbness or weakness  SKIN: No itching, burning, rashes, or lesions   All other review of systems is negative unless indicated above.    PE:  Constitutional: NAD, laying in bed  HEENT: NC/AT  Back: no tenderness  Respiratory: respirations even and non labored, LCTA  Cardiovascular: S1S2 regular, no murmurs  Abdomen: soft, not tender, not distended, positive BS  Genitourinary: voiding  Rectal: deferred  Musculoskeletal: no muscle tenderness, no joint swelling or tenderness  Extremities: no pedal edema   Neurological: no focal deficits    Admission BG > 600  HbA1c    07-16    142  |  111<H>  |  12  ----------------------------<  153<H>  3.6   |  25  |  0.70    Ca    8.0<L>      16 Jul 2020 08:12  Phos  2.9     07-16  Mg     1.9     07-16    TPro  5.2<L>  /  Alb  2.9<L>  /  TBili  0.6  /  DBili  x   /  AST  27  /  ALT  60  /  AlkPhos  70  07-16                        13.3   7.66  )-----------( 271      ( 16 Jul 2020 08:12 )             37.6       Assessment and Plan:   · Assessment		  40 yo M with a PMH anxiety with depression who presents with multiple complaints, found to have new onset uncontrolled diabetes mellitus.    Uncontrolled DM:  -Reviewed pathophysiology with patient and need to determine underlying etiology.  ObtainGAD and islet cell antibodies to rule out type 1 diabetes given low BMI and presentation.  -Patient requires Diabetes Education.  Please have the patient perform own insulin dosing in the hospital.  The patient will be seen hopefully same day as discharge for further instructions in teaching and to transition from insulin vials to insulin pen.  -Patient will be set up with glucometer for 3 times a day testing initially with the hope to taper.  -Discharge pt on basal insulin lantus 20 units qhs.  Will titrate per fasting BG.  -Will introduce oral medication once labs reviewed.  -Pt can be given sliding scale humalog dosing 5 units + correction 1/50 > 150.     Anxiety with depression:  -Per primary team.  -Discuss with psychiatry potential for alternative medications.     Thank you for this interesting consultation.  We look forward to working the.  The patient should have on his discharge documentation to follow-up with Dr. Lopez [phone] at [address] [place] [address].  Pt will be given phone call on 7/17 to arrange timely follow-up. HPI: 40 yo male, hx of anxiety with depression, who presented with 3 month hx of polyuria and polydipsia associated with 35 lb weight loss.  BMI is currently 19.   he states that he did have a change in medication recently for his depression and was started on Seroquel.  He has had occasional vision blurriness but denies any neuropathy.  He is unsure of his family history but denies any knowledge of diabetes.  He states that he has not been to a primary care doctor in quite some time prior to this event.    The patient acknowledges a poor diet with increased soda intake and his BG on admission was > 600.     Vital Signs Last 24 Hrs  T(C): 36.6 (16 Jul 2020 08:25), Max: 37.4 (15 Jul 2020 21:40)  T(F): 97.9 (16 Jul 2020 08:25), Max: 99.4 (15 Jul 2020 21:40)  HR: 64 (16 Jul 2020 08:25) (62 - 83)  BP: 111/68 (16 Jul 2020 08:25) (111/68 - 132/80)  BP(mean): 87 (15 Jul 2020 19:48) (87 - 95)  RR: 18 (16 Jul 2020 08:25) (18 - 18)  SpO2: 100% (16 Jul 2020 08:25) (100% - 100%)    REVIEW OF SYSTEMS:    CONSTITUTIONAL: No weakness, fevers or chills  EYES/ENT: No visual changes;  No vertigo  NECK: No pain or stiffness  RESPIRATORY: No cough, wheezing, No shortness of breath  CARDIOVASCULAR: No chest pain or palpitations  GASTROINTESTINAL: No abdominal or epigastric pain. No nausea, vomiting, or hematemesis  GENITOURINARY: No dysuria, frequency or hematuria  NEUROLOGICAL: No numbness or weakness  SKIN: No itching, burning, rashes, or lesions   All other review of systems is negative unless indicated above.    PE:  Constitutional: NAD, laying in bed  HEENT: NC/AT  Back: no tenderness  Respiratory: normal resp rate  Cardiovascular: S1S2 regular, no murmurs  Abdomen: soft, nontender per pt  Musculoskeletal: no muscle tenderness, no joint swelling or tenderness reported  Extremities: no edema  Neurological: no focal deficits    Admission BG > 600  HbA1c pending  C-peptide 0.5    07-16    142  |  111<H>  |  12  ----------------------------<  153<H>  3.6   |  25  |  0.70    Ca    8.0<L>      16 Jul 2020 08:12  Phos  2.9     07-16  Mg     1.9     07-16    TPro  5.2<L>  /  Alb  2.9<L>  /  TBili  0.6  /  DBili  x   /  AST  27  /  ALT  60  /  AlkPhos  70  07-16                        13.3   7.66  )-----------( 271      ( 16 Jul 2020 08:12 )             37.6       Assessment and Plan:   · Assessment		  40 yo M with a PMH anxiety with depression who presents with multiple complaints, found to have new onset uncontrolled diabetes mellitus.    Uncontrolled DM:  -Reviewed pathophysiology with patient and need to determine underlying etiology.  Obtain MEGAN and islet cell antibodies to rule out type 1 diabetes given low BMI and presentation and high suspicion given low c-peptide.  -Patient requires Diabetes Education.  Please have the patient perform own insulin dosing in the hospital.  The patient will be seen hopefully same day as discharge for further instructions in teaching and to transition from insulin vials to insulin pen.  -Patient will be set up with glucometer for 3 times a day testing initially with the hope to taper.  -Discharge pt on basal insulin lantus 15 units qhs.  Will titrate per fasting BG.  -Will introduce oral medication once labs reviewed if possible.  -Pt can be given sliding scale humalog dosing 5 units + correction 1/50 > 150.     Anxiety with depression:  -Per primary team.  -Discuss with psychiatry potential for alternative medications.     Thank you for this interesting consultation.  We look forward to following with you.  The patient should have on his discharge documentation to follow-up with Dr. Lopez [phone] at [address] [place] [address].  Pt will be given phone call on 7/17 to arrange timely follow-up.

## 2020-07-17 ENCOUNTER — TRANSCRIPTION ENCOUNTER (OUTPATIENT)
Age: 40
End: 2020-07-17

## 2020-07-17 VITALS
TEMPERATURE: 98 F | DIASTOLIC BLOOD PRESSURE: 66 MMHG | OXYGEN SATURATION: 100 % | HEART RATE: 66 BPM | RESPIRATION RATE: 17 BRPM | SYSTOLIC BLOOD PRESSURE: 92 MMHG

## 2020-07-17 LAB
A1C WITH ESTIMATED AVERAGE GLUCOSE RESULT: >15.5 % — HIGH (ref 4–5.6)
ANION GAP SERPL CALC-SCNC: 5 MMOL/L — SIGNIFICANT CHANGE UP (ref 5–17)
BUN SERPL-MCNC: 12 MG/DL — SIGNIFICANT CHANGE UP (ref 7–23)
CALCIUM SERPL-MCNC: 8.2 MG/DL — LOW (ref 8.5–10.1)
CHLORIDE SERPL-SCNC: 106 MMOL/L — SIGNIFICANT CHANGE UP (ref 96–108)
CO2 SERPL-SCNC: 27 MMOL/L — SIGNIFICANT CHANGE UP (ref 22–31)
CREAT SERPL-MCNC: 0.97 MG/DL — SIGNIFICANT CHANGE UP (ref 0.5–1.3)
ESTIMATED AVERAGE GLUCOSE: >398 MG/DL — HIGH (ref 68–114)
GLUCOSE SERPL-MCNC: 253 MG/DL — HIGH (ref 70–99)
POTASSIUM SERPL-MCNC: 3.5 MMOL/L — SIGNIFICANT CHANGE UP (ref 3.5–5.3)
POTASSIUM SERPL-SCNC: 3.5 MMOL/L — SIGNIFICANT CHANGE UP (ref 3.5–5.3)
SODIUM SERPL-SCNC: 138 MMOL/L — SIGNIFICANT CHANGE UP (ref 135–145)

## 2020-07-17 PROCEDURE — 99239 HOSP IP/OBS DSCHRG MGMT >30: CPT

## 2020-07-17 RX ORDER — INSULIN GLARGINE 100 [IU]/ML
15 INJECTION, SOLUTION SUBCUTANEOUS
Qty: 1 | Refills: 0
Start: 2020-07-17 | End: 2020-08-15

## 2020-07-17 RX ORDER — ISOPROPYL ALCOHOL, BENZOCAINE .7; .06 ML/ML; ML/ML
1 SWAB TOPICAL
Qty: 100 | Refills: 1
Start: 2020-07-17 | End: 2020-09-04

## 2020-07-17 RX ORDER — INSULIN GLARGINE 100 [IU]/ML
10 INJECTION, SOLUTION SUBCUTANEOUS
Qty: 1 | Refills: 0 | DISCHARGE
Start: 2020-07-17 | End: 2020-08-15

## 2020-07-17 RX ORDER — INSULIN LISPRO 100/ML
0 VIAL (ML) SUBCUTANEOUS
Qty: 1 | Refills: 0 | DISCHARGE
Start: 2020-07-17 | End: 2020-08-15

## 2020-07-17 RX ORDER — INSULIN LISPRO 100/ML
5 VIAL (ML) SUBCUTANEOUS
Qty: 1 | Refills: 0
Start: 2020-07-17 | End: 2020-08-15

## 2020-07-17 RX ADMIN — Medication 4: at 07:46

## 2020-07-17 RX ADMIN — Medication 5 UNIT(S): at 12:05

## 2020-07-17 RX ADMIN — Medication 2: at 12:05

## 2020-07-17 RX ADMIN — Medication 5 UNIT(S): at 07:46

## 2020-07-17 NOTE — DISCHARGE NOTE PROVIDER - NSDCCPCAREPLAN_GEN_ALL_CORE_FT
PRINCIPAL DISCHARGE DIAGNOSIS  Diagnosis: Diabetes  Assessment and Plan of Treatment: Continue to follow with your primary care MD or your endocrinologist. Discuss what the goal hemoglobin A1C level is for you.  Follow a heart healthy diabetic diet. If you check your fingerstick glucose at home, call your MD if it is greater than 250mg/dL on 2 occasions or less than 100mg/dL on 2 occasions. Know signs of low blood sugar, such as: dizziness, shakiness, sweating, confusion, hunger, nervousness- drink 4 ounces apple juice if occurs and call your doctor. Know early signs of high blood sugar, such as: frequent urination, increased thirst, blurry vision, fatigue, headache - call your doctor if this occurs.   F/ ith Dr Lopez as an outpatient- please call to make an appointment.   You need to establish care with a PCP in the community.

## 2020-07-17 NOTE — DISCHARGE NOTE PROVIDER - NSDCMRMEDTOKEN_GEN_ALL_CORE_FT
alcohol swabs : Apply topically to affected area 4 times a day   Basaglar KwikPen 100 units/mL subcutaneous solution: 15 unit(s) subcutaneous once a day (at bedtime)   buPROPion 300 mg/24 hours (XL) oral tablet, extended release: 1 tab(s) orally once a day (at bedtime)  glucometer (per patient&#x27;s insurance): Test blood sugars four times a day. Dispense #1 glucometer.  lancets: 1 application subcutaneously 4 times a day   QUEtiapine 100 mg oral tablet: 1 tab(s) orally once a day (in the evening)  test strips (per patient&#x27;s insurance): 1 application subcutaneously 4 times a day. ** Compatible with patient&#x27;s glucometer ** alcohol swabs : Apply topically to affected area 4 times a day   Basaglar KwikPen 100 units/mL subcutaneous solution: 15 unit(s) subcutaneous once a day (at bedtime)   buPROPion 300 mg/24 hours (XL) oral tablet, extended release: 1 tab(s) orally once a day (at bedtime)  glucometer (per patient&#x27;s insurance): Test blood sugars four times a day. Dispense #1 glucometer.  HumaLOG KwikPen 100 units/mL injectable solution: 5 unit(s) subcutaneous 3 times a day (before meals)   lancets: 1 application subcutaneously 4 times a day   QUEtiapine 100 mg oral tablet: 1 tab(s) orally once a day (in the evening)  test strips (per patient&#x27;s insurance): 1 application subcutaneously 4 times a day. ** Compatible with patient&#x27;s glucometer **

## 2020-07-17 NOTE — DISCHARGE NOTE PROVIDER - HOSPITAL COURSE
38 yo M with a PMH anxiety with depression who presents with feeling very thirsty with polyuria and a 30 pound weight loss for the past 3 months. He has had weakness and myalgias and has had difficulty and fatigue lifting things at work. ROS is also positive for an episode of chills a week ago, and some nausea, but otherwise is negative. He stopped his Wellbutrin and Seroquel 2 days ago because he thought his symptoms might be medication induced. He went to Urgent Care today and was found to have glucose in his urine, with concern for diabetes, and was recommended to come to the ED.    The patient acknowledges a poor diet, with little vegetable intake, eating lots of candy, and soda use (2L at dinner on occasion, although has stopped soda recently). Of note, he also was started on Seroquel about 4 months ago.    Admission BGM >600, small serum acetone and hyponatremia- IVF and insulin started.         Vital Signs Last 24 Hrs    T(C): 36.8 (17 Jul 2020 09:15), Max: 36.8 (16 Jul 2020 17:02)    T(F): 98.2 (17 Jul 2020 09:15), Max: 98.3 (16 Jul 2020 17:02)    HR: 66 (17 Jul 2020 09:15) (60 - 66)    BP: 92/66 (17 Jul 2020 09:15) (92/66 - 105/63)    BP(mean): --    RR: 17 (17 Jul 2020 09:15) (17 - 17)    SpO2: 100% (17 Jul 2020 09:15) (100% - 100%).        PE:    Constitutional: NAD, laying in bed    HEENT: NC/AT    Back: no tenderness    Respiratory: respirations even and non labored, LCTA    Cardiovascular: S1S2 regular, no murmurs    Abdomen: soft, not tender, not distended, positive BS    Genitourinary: voiding    Rectal: deferred    Musculoskeletal: no muscle tenderness, no joint swelling or tenderness    Extremities: no pedal edema     Neurological: no focal deficits        1) Uncontrolled Diabetes Mellitus    - New onset diabetes mellitus. Likely due to poor diet, although may be complicated by recent Seroquel use    - With slight serum acetone but no significant evidence of DKA    - Endocrinology consult- will need to f/u with Dr Lopez as an outpatient    - Extensive Diabetes education prior to dc    - AS per ENDOCRINE- Discharge pt on basal insulin lantus 15 units qhs.  Will titrate per fasting BG.    - Will introduce oral medication once labs reviewed if possible.    - Pt can be given sliding scale humalog dosing 5 units + correction 1/50 > 150.     - will need to f/u with Dr Schroeder as an outpaitent        2) Anxiety with depression    - Will continue Bupropion 300 mg  and Quetiapine 100 mg QHS    - Discussed how patient should talk with his psychiatrist about possibly changing Quetiapine in light of new diabetes        dc today after diabetes education completed.    Case d/w team and MD on IDR. Plan explained to patient and all of their concerns were addressed.

## 2020-07-17 NOTE — DISCHARGE NOTE PROVIDER - CARE PROVIDER_API CALL
Radha Lopez  INTERNAL MEDICINE  [address] [place] [address]  Phone: [phone]  Fax: [phone]  Follow Up Time:     Madonna Moctezuma)  Internal Medicine  [address] [place] [address], [address]  [place] [address]  Phone: [phone]  Fax: [phone]  Follow Up Time:

## 2020-07-17 NOTE — CHART NOTE - NSCHARTNOTEFT_GEN_A_CORE
Upon Nutritional Assessment by the Registered Dietitian your patient was determined to meet criteria / has evidence of the following diagnosis/diagnoses:          [ ]  Mild Protein Calorie Malnutrition        [ ]  Moderate Protein Calorie Malnutrition        [ x] Severe Protein Calorie Malnutrition        [ ] Unspecified Protein Calorie Malnutrition        [ ] Underweight / BMI <19        [ ] Morbid Obesity / BMI > 40      Findings as based on:  •  Comprehensive nutrition assessment and consultation  •  Calorie counts (nutrient intake analysis)  •  Food acceptance and intake status from observations by staff  •  Follow up  •  Patient education  •  Intervention secondary to interdisciplinary rounds  •   concerns    Pt meets criteria for severe protein-calorie malnutrition in context of chronic disease.    PT reports loss of 18% UBW in 3 months,   PO intake < 75% nutritional needs > one month.   NFPE reveals severe fat wasting (triceps, orbital area.)    Moderate/severe muscle wasting (clavicles, shoulders, scapula, calves, thighs).        Treatment:    The following diet has been recommended:  Maintain Consistent Carb diet  add Glucerna tid  add gelatein bid  Weekly weights  Record PO intake in EMR after each meal (nursing.)   Monitor PO intake, tolerance, labs and weight.       PROVIDER Section:     By signing this assessment you are acknowledging and agree with the diagnosis/diagnoses assigned by the Registered Dietitian    Comments:

## 2020-07-21 DIAGNOSIS — E43 UNSPECIFIED SEVERE PROTEIN-CALORIE MALNUTRITION: ICD-10-CM

## 2020-07-21 DIAGNOSIS — E11.65 TYPE 2 DIABETES MELLITUS WITH HYPERGLYCEMIA: ICD-10-CM

## 2020-07-21 DIAGNOSIS — Z71.3 DIETARY COUNSELING AND SURVEILLANCE: ICD-10-CM

## 2020-07-21 DIAGNOSIS — Z87.891 PERSONAL HISTORY OF NICOTINE DEPENDENCE: ICD-10-CM

## 2020-07-21 DIAGNOSIS — E87.1 HYPO-OSMOLALITY AND HYPONATREMIA: ICD-10-CM

## 2020-07-21 DIAGNOSIS — F41.8 OTHER SPECIFIED ANXIETY DISORDERS: ICD-10-CM

## 2020-07-21 LAB — ISLET CELL512 AB SER-ACNC: SIGNIFICANT CHANGE UP

## 2020-07-22 LAB — GAD65 AB SER-MCNC: 0.01 NMOL/L — SIGNIFICANT CHANGE UP

## 2020-07-24 PROBLEM — F41.8 OTHER SPECIFIED ANXIETY DISORDERS: Chronic | Status: ACTIVE | Noted: 2020-07-15

## 2020-07-24 PROBLEM — E11.9 TYPE 2 DIABETES MELLITUS WITHOUT COMPLICATIONS: Chronic | Status: ACTIVE | Noted: 2020-07-15

## 2020-07-28 ENCOUNTER — NON-APPOINTMENT (OUTPATIENT)
Age: 40
End: 2020-07-28

## 2020-07-28 ENCOUNTER — APPOINTMENT (OUTPATIENT)
Dept: FAMILY MEDICINE | Facility: CLINIC | Age: 40
End: 2020-07-28
Payer: COMMERCIAL

## 2020-07-28 VITALS
SYSTOLIC BLOOD PRESSURE: 132 MMHG | HEIGHT: 70 IN | DIASTOLIC BLOOD PRESSURE: 80 MMHG | WEIGHT: 146.13 LBS | OXYGEN SATURATION: 99 % | HEART RATE: 77 BPM | BODY MASS INDEX: 20.92 KG/M2

## 2020-07-28 DIAGNOSIS — Z87.891 PERSONAL HISTORY OF NICOTINE DEPENDENCE: ICD-10-CM

## 2020-07-28 DIAGNOSIS — Z00.00 ENCOUNTER FOR GENERAL ADULT MEDICAL EXAMINATION W/OUT ABNORMAL FINDINGS: ICD-10-CM

## 2020-07-28 DIAGNOSIS — H53.8 OTHER VISUAL DISTURBANCES: ICD-10-CM

## 2020-07-28 DIAGNOSIS — Z13.31 ENCOUNTER FOR SCREENING FOR DEPRESSION: ICD-10-CM

## 2020-07-28 PROCEDURE — 93000 ELECTROCARDIOGRAM COMPLETE: CPT

## 2020-07-28 PROCEDURE — 96127 BRIEF EMOTIONAL/BEHAV ASSMT: CPT

## 2020-07-28 PROCEDURE — 99386 PREV VISIT NEW AGE 40-64: CPT | Mod: 25

## 2020-07-28 NOTE — HEALTH RISK ASSESSMENT
[6-10] : 6-10 [No] : No [0] : 2) Feeling down, depressed, or hopeless: Not at all (0) [] : No [de-identified] : 15 years x 1/2 PPD [YearQuit] : 2017 [de-identified] : very active lifestyle  [de-identified] : previously before DM dx was eating junk food. Now eats veggies and has a much healthier diet  [WPN2Hzfdk] : 0

## 2020-07-28 NOTE — PLAN
[FreeTextEntry1] : DM2\par - cont f/u with endocrine\par - advised to let us know names of medications he is taking\par - opthalmology and podiatry referrals provided\par \par Blurry vision\par - advised immediate f/u with opthalmology\par \par Penile lump\par - f/u US

## 2020-07-28 NOTE — PHYSICAL EXAM
[No Acute Distress] : no acute distress [Well-Appearing] : well-appearing [PERRL] : pupils equal round and reactive to light [Normal Sclera/Conjunctiva] : normal sclera/conjunctiva [Normal TMs] : both tympanic membranes were normal [Normal Outer Ear/Nose] : the outer ears and nose were normal in appearance [No Respiratory Distress] : no respiratory distress  [No Accessory Muscle Use] : no accessory muscle use [Clear to Auscultation] : lungs were clear to auscultation bilaterally [Normal Rate] : normal rate  [Regular Rhythm] : with a regular rhythm [Soft] : abdomen soft [Non Tender] : non-tender [Non-distended] : non-distended [Normal Bowel Sounds] : normal bowel sounds [No Rash] : no rash [No Focal Deficits] : no focal deficits [Normal Affect] : the affect was normal [Normal Insight/Judgement] : insight and judgment were intact [de-identified] : + nodule on penis

## 2020-07-28 NOTE — HISTORY OF PRESENT ILLNESS
[FreeTextEntry1] : establish care  [de-identified] : 39 year old male with newly dx DM2 presents to establish care . He stated he had been feeling extremely lethargic and drinking excessive amounts of water. He works as a  and could barely get through the day. He went to ER and was found to have hgba1c 15.5. He is now followed by endocrine. He is on long acting insulin 10 units at night and takes short acting insulin on sliding scale basis. He also takes an oral medication in the morning. Is unsure of names of medications. Complains of blurry vision but believes it is his eyes reacting to lower blood sugar. Has not yet seen eye doctor. He checks sugar with freestyle peter. \par He complains of mass on penis that changes in size. Denies pain but states it is uncomfortable.

## 2020-07-30 ENCOUNTER — APPOINTMENT (OUTPATIENT)
Dept: UROLOGY | Facility: CLINIC | Age: 40
End: 2020-07-30
Payer: COMMERCIAL

## 2020-07-30 VITALS
WEIGHT: 150 LBS | TEMPERATURE: 98.1 F | DIASTOLIC BLOOD PRESSURE: 76 MMHG | SYSTOLIC BLOOD PRESSURE: 119 MMHG | HEART RATE: 61 BPM | OXYGEN SATURATION: 100 % | HEIGHT: 70 IN | BODY MASS INDEX: 21.47 KG/M2

## 2020-07-30 DIAGNOSIS — Z80.0 FAMILY HISTORY OF MALIGNANT NEOPLASM OF DIGESTIVE ORGANS: ICD-10-CM

## 2020-07-30 DIAGNOSIS — Z86.39 PERSONAL HISTORY OF OTHER ENDOCRINE, NUTRITIONAL AND METABOLIC DISEASE: ICD-10-CM

## 2020-07-30 DIAGNOSIS — F17.200 NICOTINE DEPENDENCE, UNSPECIFIED, UNCOMPLICATED: ICD-10-CM

## 2020-07-30 DIAGNOSIS — Z80.42 FAMILY HISTORY OF MALIGNANT NEOPLASM OF PROSTATE: ICD-10-CM

## 2020-07-30 DIAGNOSIS — Z78.9 OTHER SPECIFIED HEALTH STATUS: ICD-10-CM

## 2020-07-30 DIAGNOSIS — Z82.49 FAMILY HISTORY OF ISCHEMIC HEART DISEASE AND OTHER DISEASES OF THE CIRCULATORY SYSTEM: ICD-10-CM

## 2020-07-30 PROCEDURE — 99204 OFFICE O/P NEW MOD 45 MIN: CPT

## 2020-07-30 RX ORDER — QUETIAPINE FUMARATE 50 MG/1
50 TABLET ORAL
Qty: 30 | Refills: 0 | Status: COMPLETED | COMMUNITY
Start: 2020-05-02

## 2020-07-30 RX ORDER — BUPROPION HYDROCHLORIDE 150 MG/1
150 TABLET, EXTENDED RELEASE ORAL
Qty: 30 | Refills: 0 | Status: COMPLETED | COMMUNITY
Start: 2020-05-02

## 2020-07-30 RX ORDER — AZITHROMYCIN 250 MG/1
250 TABLET, FILM COATED ORAL
Qty: 6 | Refills: 0 | Status: COMPLETED | COMMUNITY
Start: 2020-02-23

## 2020-07-30 NOTE — ASSESSMENT
[FreeTextEntry1] : 40 yo male with penile lesion on the ventral aspect of penile shaft\par - Schedule for excision of penile lesion in the office. All risks and benefits of the procedure explained to the patient. Questions/concerns were addressed.

## 2020-07-30 NOTE — HISTORY OF PRESENT ILLNESS
[FreeTextEntry1] : 39 year old man seen 07/30/2020 with complaint of "lump on penis". This began 1 year ago. Patient reports he has had a lump on the penile shaft for a year. He reports it is non tender and would like it to be excised. He denies any discharge, erythema or edema. Reports his grandfather was diagnosed with prostate ca but does not recall at what age. He also noted polyuria which has resolved after he was diagnosed with diabetes recently and placed on insulin.\par It is mild in severity. Nothing makes the symptoms better, nothing makes sx worse. \par It is associated with nothing.\par No hematuria, no dysuria, no frequency, no urgency, no hesitancy, no straining. No incontinence. \par No fevers, no chills, no nausea, no vomiting, no flank pain. \par \par No family history contributory to Penile lesion\par

## 2020-07-30 NOTE — REVIEW OF SYSTEMS
[Recent Weight Loss (___ Lbs)] : recent [unfilled] ~Ulb weight loss [Wake up at night to urinate  How many times?  ___] : wakes up to urinate [unfilled] times during the night [Eyesight Problems] : eyesight problems [Strong urge to urinate] : strong urge to urinate [Muscle Weakness] : muscle weakness [Feelings Of Weakness] : feelings of weakness [Negative] : Heme/Lymph

## 2020-07-30 NOTE — PHYSICAL EXAM
[Normal Appearance] : normal appearance [General Appearance - Well Developed] : well developed [General Appearance - Well Nourished] : well nourished [Well Groomed] : well groomed [General Appearance - In No Acute Distress] : no acute distress [Edema] : no peripheral edema [Respiration, Rhythm And Depth] : normal respiratory rhythm and effort [Abdomen Soft] : soft [Exaggerated Use Of Accessory Muscles For Inspiration] : no accessory muscle use [Abdomen Tenderness] : non-tender [Costovertebral Angle Tenderness] : no ~M costovertebral angle tenderness [Urethral Meatus] : meatus normal [Urinary Bladder Findings] : the bladder was normal on palpation [Scrotum] : the scrotum was normal [Testes Mass (___cm)] : there were no testicular masses [Normal Station and Gait] : the gait and station were normal for the patient's age [No Focal Deficits] : no focal deficits [] : no rash [Oriented To Time, Place, And Person] : oriented to person, place, and time [Affect] : the affect was normal [Mood] : the mood was normal [Not Anxious] : not anxious [No Palpable Adenopathy] : no palpable adenopathy [FreeTextEntry1] : 1 cm lump appreciated under the skin of the ventral aspect of the penile shaft

## 2020-08-04 ENCOUNTER — APPOINTMENT (OUTPATIENT)
Dept: UROLOGY | Facility: CLINIC | Age: 40
End: 2020-08-04

## 2020-08-12 ENCOUNTER — LABORATORY RESULT (OUTPATIENT)
Age: 40
End: 2020-08-12

## 2020-08-13 ENCOUNTER — APPOINTMENT (OUTPATIENT)
Dept: UROLOGY | Facility: CLINIC | Age: 40
End: 2020-08-13
Payer: COMMERCIAL

## 2020-08-13 VITALS
HEIGHT: 69 IN | DIASTOLIC BLOOD PRESSURE: 79 MMHG | OXYGEN SATURATION: 100 % | TEMPERATURE: 98.7 F | SYSTOLIC BLOOD PRESSURE: 121 MMHG | WEIGHT: 150 LBS | BODY MASS INDEX: 22.22 KG/M2 | HEART RATE: 84 BPM

## 2020-08-13 PROCEDURE — 11422 EXC H-F-NK-SP B9+MARG 1.1-2: CPT

## 2020-08-18 ENCOUNTER — APPOINTMENT (OUTPATIENT)
Dept: UROLOGY | Facility: CLINIC | Age: 40
End: 2020-08-18
Payer: COMMERCIAL

## 2020-08-18 PROCEDURE — 99024 POSTOP FOLLOW-UP VISIT: CPT

## 2020-08-18 NOTE — PHYSICAL EXAM
[General Appearance - Well Developed] : well developed [Well Groomed] : well groomed [General Appearance - Well Nourished] : well nourished [Normal Appearance] : normal appearance [Abdomen Soft] : soft [General Appearance - In No Acute Distress] : no acute distress [Abdomen Tenderness] : non-tender [Costovertebral Angle Tenderness] : no ~M costovertebral angle tenderness [Urethral Meatus] : meatus normal [Urinary Bladder Findings] : the bladder was normal on palpation [Scrotum] : the scrotum was normal [Testes Mass (___cm)] : there were no testicular masses [Edema] : no peripheral edema [FreeTextEntry1] : some inflammation around the penile lesion excision site, no drainage, fluctuance or tenderness. No evidence of hematoma [Respiration, Rhythm And Depth] : normal respiratory rhythm and effort [] : no respiratory distress [Affect] : the affect was normal [Exaggerated Use Of Accessory Muscles For Inspiration] : no accessory muscle use [Oriented To Time, Place, And Person] : oriented to person, place, and time [Not Anxious] : not anxious [Mood] : the mood was normal [Normal Station and Gait] : the gait and station were normal for the patient's age [No Palpable Adenopathy] : no palpable adenopathy [No Focal Deficits] : no focal deficits

## 2020-08-18 NOTE — HISTORY OF PRESENT ILLNESS
[FreeTextEntry1] : This patient presents with a complaint of some inflammation around the penile lesion excision site. He denies any discharge, ecchymosis or pain.

## 2020-08-19 ENCOUNTER — APPOINTMENT (OUTPATIENT)
Dept: UROLOGY | Facility: CLINIC | Age: 40
End: 2020-08-19

## 2020-08-25 ENCOUNTER — APPOINTMENT (OUTPATIENT)
Dept: UROLOGY | Facility: CLINIC | Age: 40
End: 2020-08-25
Payer: COMMERCIAL

## 2020-08-25 DIAGNOSIS — N48.89 OTHER SPECIFIED DISORDERS OF PENIS: ICD-10-CM

## 2020-08-25 PROCEDURE — 99213 OFFICE O/P EST LOW 20 MIN: CPT

## 2020-08-25 NOTE — HISTORY OF PRESENT ILLNESS
[FreeTextEntry1] : This patient had a penile lesion removed and had some inflammatory changes.  He is here for checkup.  He does note that he feels better

## 2020-08-25 NOTE — PHYSICAL EXAM
[General Appearance - Well Developed] : well developed [Normal Appearance] : normal appearance [General Appearance - Well Nourished] : well nourished [Well Groomed] : well groomed [General Appearance - In No Acute Distress] : no acute distress [Abdomen Tenderness] : non-tender [Abdomen Soft] : soft [Costovertebral Angle Tenderness] : no ~M costovertebral angle tenderness [Urethral Meatus] : meatus normal [Urinary Bladder Findings] : the bladder was normal on palpation [Scrotum] : the scrotum was normal [Testes Mass (___cm)] : there were no testicular masses [FreeTextEntry1] : Improvement is noted. [Edema] : no peripheral edema [] : no respiratory distress [Respiration, Rhythm And Depth] : normal respiratory rhythm and effort [Exaggerated Use Of Accessory Muscles For Inspiration] : no accessory muscle use [Oriented To Time, Place, And Person] : oriented to person, place, and time [Affect] : the affect was normal [Mood] : the mood was normal [Not Anxious] : not anxious [Normal Station and Gait] : the gait and station were normal for the patient's age [No Focal Deficits] : no focal deficits [No Palpable Adenopathy] : no palpable adenopathy

## 2020-08-26 RX ORDER — DAPAGLIFLOZIN 10 MG/1
10 TABLET, FILM COATED ORAL
Qty: 30 | Refills: 0 | Status: ACTIVE | COMMUNITY
Start: 2020-08-03

## 2021-02-09 ENCOUNTER — APPOINTMENT (OUTPATIENT)
Dept: FAMILY MEDICINE | Facility: CLINIC | Age: 41
End: 2021-02-09
Payer: COMMERCIAL

## 2021-02-09 VITALS
OXYGEN SATURATION: 98 % | HEIGHT: 69 IN | SYSTOLIC BLOOD PRESSURE: 120 MMHG | HEART RATE: 78 BPM | WEIGHT: 150 LBS | BODY MASS INDEX: 22.22 KG/M2 | DIASTOLIC BLOOD PRESSURE: 74 MMHG

## 2021-02-09 DIAGNOSIS — E11.9 TYPE 2 DIABETES MELLITUS W/OUT COMPLICATIONS: ICD-10-CM

## 2021-02-09 DIAGNOSIS — M25.511 PAIN IN RIGHT SHOULDER: ICD-10-CM

## 2021-02-09 DIAGNOSIS — R20.2 PARESTHESIA OF SKIN: ICD-10-CM

## 2021-02-09 PROCEDURE — 99072 ADDL SUPL MATRL&STAF TM PHE: CPT

## 2021-02-09 PROCEDURE — 99213 OFFICE O/P EST LOW 20 MIN: CPT

## 2021-02-09 NOTE — PHYSICAL EXAM
[No Acute Distress] : no acute distress [Well-Appearing] : well-appearing [Normal Sclera/Conjunctiva] : normal sclera/conjunctiva [PERRL] : pupils equal round and reactive to light [Normal Outer Ear/Nose] : the outer ears and nose were normal in appearance [No Respiratory Distress] : no respiratory distress  [No Accessory Muscle Use] : no accessory muscle use [Clear to Auscultation] : lungs were clear to auscultation bilaterally [Normal Rate] : normal rate  [Regular Rhythm] : with a regular rhythm [No Joint Swelling] : no joint swelling [Grossly Normal Strength/Tone] : grossly normal strength/tone [No Rash] : no rash [Coordination Grossly Intact] : coordination grossly intact [No Focal Deficits] : no focal deficits [Normal Gait] : normal gait [Normal Affect] : the affect was normal [Normal Insight/Judgement] : insight and judgment were intact [de-identified] : normal strength [de-identified] : pain with R shoulder flexion. neg drop arms. neg phalens

## 2021-02-09 NOTE — PLAN
[FreeTextEntry1] : R shoulder pain and paresthesia\par - likely thoracic outlet syndrome secondary to trauma\par - avoid lifting\par - trial of meloxicam, medrol dose zee and flexeril. Counseled on risks and benefits of medications. Should not take flexeril when driving. Medrol dose zee may cause slight elevation in BS for short term. \par - f/u xrays\par \par DM2\par - endo appt next week\par - BS around 100 on average, post prandial around 200-250\par \par f/u in 2 weeks

## 2021-02-09 NOTE — HISTORY OF PRESENT ILLNESS
[FreeTextEntry8] : 40 year old male complains of right arm numbness and tingling as well as R shoulder pain for 5 days. Admits to shoveling heavy snow. He is also lifting heavy packages often as he works as . He is able to move his shoulder but has pain with lifting overhead on right side. Denies other associated symptoms\par Has appt with endocrine next week for his DM2

## 2021-03-03 ENCOUNTER — RX RENEWAL (OUTPATIENT)
Age: 41
End: 2021-03-03

## 2021-07-18 ENCOUNTER — EMERGENCY (EMERGENCY)
Facility: HOSPITAL | Age: 41
LOS: 0 days | Discharge: HOME | End: 2021-07-19
Attending: STUDENT IN AN ORGANIZED HEALTH CARE EDUCATION/TRAINING PROGRAM
Payer: COMMERCIAL

## 2021-07-18 VITALS
DIASTOLIC BLOOD PRESSURE: 64 MMHG | SYSTOLIC BLOOD PRESSURE: 96 MMHG | HEART RATE: 83 BPM | OXYGEN SATURATION: 96 % | RESPIRATION RATE: 17 BRPM

## 2021-07-18 VITALS
HEIGHT: 69 IN | RESPIRATION RATE: 19 BRPM | DIASTOLIC BLOOD PRESSURE: 90 MMHG | SYSTOLIC BLOOD PRESSURE: 135 MMHG | HEART RATE: 108 BPM | TEMPERATURE: 98 F | OXYGEN SATURATION: 95 % | WEIGHT: 149.91 LBS

## 2021-07-18 DIAGNOSIS — Z20.822 CONTACT WITH AND (SUSPECTED) EXPOSURE TO COVID-19: ICD-10-CM

## 2021-07-18 DIAGNOSIS — F41.8 OTHER SPECIFIED ANXIETY DISORDERS: ICD-10-CM
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
## 2021-07-18 DIAGNOSIS — F10.129 ALCOHOL ABUSE WITH INTOXICATION, UNSPECIFIED: ICD-10-CM

## 2021-07-18 DIAGNOSIS — Z90.89 ACQUIRED ABSENCE OF OTHER ORGANS: Chronic | ICD-10-CM

## 2021-07-18 DIAGNOSIS — E11.9 TYPE 2 DIABETES MELLITUS WITHOUT COMPLICATIONS: ICD-10-CM

## 2021-07-18 LAB
ALBUMIN SERPL ELPH-MCNC: 4.1 G/DL — SIGNIFICANT CHANGE UP (ref 3.3–5)
ALP SERPL-CCNC: 72 U/L — SIGNIFICANT CHANGE UP (ref 40–120)
ALT FLD-CCNC: 101 U/L — HIGH (ref 12–78)
ANION GAP SERPL CALC-SCNC: 6 MMOL/L — SIGNIFICANT CHANGE UP (ref 5–17)
APAP SERPL-MCNC: < 2 UG/ML — SIGNIFICANT CHANGE UP (ref 10–30)
AST SERPL-CCNC: 47 U/L — HIGH (ref 15–37)
BASOPHILS # BLD AUTO: 0.07 K/UL — SIGNIFICANT CHANGE UP (ref 0–0.2)
BASOPHILS NFR BLD AUTO: 0.9 % — SIGNIFICANT CHANGE UP (ref 0–2)
BILIRUB SERPL-MCNC: 0.3 MG/DL — SIGNIFICANT CHANGE UP (ref 0.2–1.2)
BUN SERPL-MCNC: 15 MG/DL — SIGNIFICANT CHANGE UP (ref 7–23)
CALCIUM SERPL-MCNC: 10.5 MG/DL — HIGH (ref 8.5–10.1)
CHLORIDE SERPL-SCNC: 113 MMOL/L — HIGH (ref 96–108)
CO2 SERPL-SCNC: 24 MMOL/L — SIGNIFICANT CHANGE UP (ref 22–31)
CREAT SERPL-MCNC: 0.72 MG/DL — SIGNIFICANT CHANGE UP (ref 0.5–1.3)
EOSINOPHIL # BLD AUTO: 0.07 K/UL — SIGNIFICANT CHANGE UP (ref 0–0.5)
EOSINOPHIL NFR BLD AUTO: 0.9 % — SIGNIFICANT CHANGE UP (ref 0–6)
ETHANOL SERPL-MCNC: 225 MG/DL — HIGH (ref 0–10)
GLUCOSE SERPL-MCNC: 127 MG/DL — HIGH (ref 70–99)
HCT VFR BLD CALC: 46.1 % — SIGNIFICANT CHANGE UP (ref 39–50)
HGB BLD-MCNC: 15.8 G/DL — SIGNIFICANT CHANGE UP (ref 13–17)
IMM GRANULOCYTES NFR BLD AUTO: 0.3 % — SIGNIFICANT CHANGE UP (ref 0–1.5)
LYMPHOCYTES # BLD AUTO: 1.25 K/UL — SIGNIFICANT CHANGE UP (ref 1–3.3)
LYMPHOCYTES # BLD AUTO: 16.6 % — SIGNIFICANT CHANGE UP (ref 13–44)
MCHC RBC-ENTMCNC: 29.9 PG — SIGNIFICANT CHANGE UP (ref 27–34)
MCHC RBC-ENTMCNC: 34.3 GM/DL — SIGNIFICANT CHANGE UP (ref 32–36)
MCV RBC AUTO: 87.3 FL — SIGNIFICANT CHANGE UP (ref 80–100)
MONOCYTES # BLD AUTO: 0.25 K/UL — SIGNIFICANT CHANGE UP (ref 0–0.9)
MONOCYTES NFR BLD AUTO: 3.3 % — SIGNIFICANT CHANGE UP (ref 2–14)
NEUTROPHILS # BLD AUTO: 5.87 K/UL — SIGNIFICANT CHANGE UP (ref 1.8–7.4)
NEUTROPHILS NFR BLD AUTO: 78 % — HIGH (ref 43–77)
PLATELET # BLD AUTO: 295 K/UL — SIGNIFICANT CHANGE UP (ref 150–400)
POTASSIUM SERPL-MCNC: 3.1 MMOL/L — LOW (ref 3.5–5.3)
POTASSIUM SERPL-SCNC: 3.1 MMOL/L — LOW (ref 3.5–5.3)
PROT SERPL-MCNC: 7 GM/DL — SIGNIFICANT CHANGE UP (ref 6–8.3)
RBC # BLD: 5.28 M/UL — SIGNIFICANT CHANGE UP (ref 4.2–5.8)
RBC # FLD: 12.7 % — SIGNIFICANT CHANGE UP (ref 10.3–14.5)
SALICYLATES SERPL-MCNC: <1.7 MG/DL — LOW (ref 2.8–20)
SODIUM SERPL-SCNC: 143 MMOL/L — SIGNIFICANT CHANGE UP (ref 135–145)
WBC # BLD: 7.53 K/UL — SIGNIFICANT CHANGE UP (ref 3.8–10.5)
WBC # FLD AUTO: 7.53 K/UL — SIGNIFICANT CHANGE UP (ref 3.8–10.5)

## 2021-07-18 PROCEDURE — 93005 ELECTROCARDIOGRAM TRACING: CPT

## 2021-07-18 PROCEDURE — 85025 COMPLETE CBC W/AUTO DIFF WBC: CPT

## 2021-07-18 PROCEDURE — 80307 DRUG TEST PRSMV CHEM ANLYZR: CPT

## 2021-07-18 PROCEDURE — 80053 COMPREHEN METABOLIC PANEL: CPT

## 2021-07-18 PROCEDURE — 36415 COLL VENOUS BLD VENIPUNCTURE: CPT

## 2021-07-18 PROCEDURE — U0003: CPT

## 2021-07-18 PROCEDURE — 96372 THER/PROPH/DIAG INJ SC/IM: CPT

## 2021-07-18 PROCEDURE — 99285 EMERGENCY DEPT VISIT HI MDM: CPT | Mod: 25

## 2021-07-18 PROCEDURE — U0005: CPT

## 2021-07-18 PROCEDURE — 80074 ACUTE HEPATITIS PANEL: CPT

## 2021-07-18 PROCEDURE — 99285 EMERGENCY DEPT VISIT HI MDM: CPT

## 2021-07-18 PROCEDURE — 93010 ELECTROCARDIOGRAM REPORT: CPT

## 2021-07-18 RX ORDER — HALOPERIDOL DECANOATE 100 MG/ML
5 INJECTION INTRAMUSCULAR ONCE
Refills: 0 | Status: COMPLETED | OUTPATIENT
Start: 2021-07-18 | End: 2021-07-18

## 2021-07-18 RX ORDER — DIPHENHYDRAMINE HCL 50 MG
50 CAPSULE ORAL ONCE
Refills: 0 | Status: COMPLETED | OUTPATIENT
Start: 2021-07-18 | End: 2021-07-18

## 2021-07-18 RX ADMIN — Medication 50 MILLIGRAM(S): at 20:00

## 2021-07-18 RX ADMIN — HALOPERIDOL DECANOATE 5 MILLIGRAM(S): 100 INJECTION INTRAMUSCULAR at 20:00

## 2021-07-18 RX ADMIN — HALOPERIDOL DECANOATE 5 MILLIGRAM(S): 100 INJECTION INTRAMUSCULAR at 19:45

## 2021-07-18 RX ADMIN — Medication 2 MILLIGRAM(S): at 19:45

## 2021-07-18 NOTE — ED ADULT TRIAGE NOTE - CHIEF COMPLAINT QUOTE
Asthma    Breast cancer  1999  Constipation    HTN (hypertension)    Osteoarthritis
Pt. presents to ED c/o intox. Pt. states he "drank too much and was acting like an kris". Pt. endorses drinking 5 shots worth of alcohol. Pt. denies medical complaints at this time

## 2021-07-18 NOTE — ED ADULT NURSE NOTE - NSIMPLEMENTINTERV_GEN_ALL_ED
Implemented All Fall Risk Interventions:  [place] to call system. Call bell, personal items and telephone within reach. Instruct patient to call for assistance. Room bathroom lighting operational. Non-slip footwear when patient is off stretcher. Physically safe environment: no spills, clutter or unnecessary equipment. Stretcher in lowest position, wheels locked, appropriate side rails in place. Provide visual cue, wrist band, yellow gown, etc. Monitor gait and stability. Monitor for mental status changes and reorient to person, place, and time. Review medications for side effects contributing to fall risk. Reinforce activity limits and safety measures with patient and family.

## 2021-07-18 NOTE — ED PROVIDER NOTE - OBJECTIVE STATEMENT
39 y/o male with a PMHx of DM, Anxiety, Depression, presents to the ED BIBA for alcohol intoxication. Pt admits to drinking alcohol due to marital issues. Pt denies SI/HI. Pt feels safe at home. No trauma or injury

## 2021-07-18 NOTE — ED PROVIDER NOTE - PATIENT PORTAL LINK FT
You can access the FollowMyHealth Patient Portal offered by [facility] by registering at the following website: [email or web address]. By joining [facility]’s FollowMyHealth portal, you will also be able to view your health information using other applications (apps) compatible with our system.

## 2021-07-18 NOTE — ED PROVIDER NOTE - NSFOLLOWUPINSTRUCTIONS_ED_ALL_ED_FT
Alcohol Use Disorder    WHAT YOU NEED TO KNOW:    Alcohol use disorder (AUD) is problem drinking. AUD includes alcohol abuse and alcohol dependence. Alcohol can damage your brain, heart, kidneys, lungs, and liver. Your risk of stroke is greater if you have 5 or more drinks each day. If you are pregnant, you and your baby are at risk for serious health problems. No amount of alcohol is safe during pregnancy.    DISCHARGE INSTRUCTIONS:    Call your local emergency number (911 in the [place]) if:     You have seizures.        Call your doctor if:     Your heart is beating faster than usual.      You have hallucinations.      You cannot remember what happens while you are drinking.      You are anxious and have nausea.      Your hands are shaky and you are sweating heavily.      You have questions or concerns about your condition or care.    Follow up with your healthcare provider as directed: Do not try to stop drinking on your own. Your healthcare provider may need to help you withdraw from alcohol safely. He or she may need to admit you to the hospital. You may also need any of the following:    Medicines to decrease your craving for alcohol      Support groups such as Alcoholics Anonymous       Therapy from a psychiatrist or psychologist      Admission to an inpatient facility for treatment for severe AUD    For support and more information:     Substance Abuse and Mental Health Services Administration  [address]  [place] [address]  Web Address: http://www.samhsa.gov      Alcoholics Anonymous  Web Address: http://www.aa.org

## 2021-07-18 NOTE — ED PROVIDER NOTE - PROGRESS NOTE DETAILS
Tom DUNCAN for Dr. Pedersen: pt became agitated, cursing at staff, states "bang bang goodbye life", will medicate and order psych labs Slava DO: S/o to Dr. Vazquez pending clinical sobriety and re-evaluation. 39 y/o M presents with intox. Pt reports drinking etoh, having marital issues at home. Denies SI/HI. pt combative with staff requiring medication. -Inderjit Blanco PA-C patient sober this morning.  Denies SI, HI, or hallucinations.  Asking for discharge home.  Will trial ambulation, and if passes can be d/c home. Aaron Vazquez D.O. Ambulated well.  D/c home. Aaron Vazquez D.O.

## 2021-07-18 NOTE — ED ADULT NURSE NOTE - CHIEF COMPLAINT QUOTE
Pt. presents to ED c/o intox. Pt. states he "drank too much and was acting like an kris". Pt. endorses drinking 5 shots worth of alcohol. Pt. denies medical complaints at this time

## 2021-07-18 NOTE — ED ADULT NURSE REASSESSMENT NOTE - NS ED NURSE REASSESS COMMENT FT1
1945 - Pt became very agitated and belligerent. Pt cursing, spitting and making racial remarks toward staff. Pt medicated as per MD order.   2000 -  Pt continues to be agitated and belligerent. Pt continues to curse, spit, make racial remarks and attempting to jump out of bed. Pt not following commands, telling staff that he want to end his life, that it is not worth living. Pt required four point restraints as per MD Pedersen.   2045 - Pt calm and sleeping in bed. Vital Signs Stable. Four point restraints removed.   See restraint flow sheets for further documentation.  Comfort and safety maintained at all times.

## 2021-07-18 NOTE — ED PROVIDER NOTE - ATTENDING CONTRIBUTION TO CARE
I, Claudia Pedersen DO,  performed the initial face to face bedside interview with this patient regarding history of present illness, review of symptoms and relevant past medical, social and family history.  I completed an independent physical examination.  I was the initial provider who evaluated this patient. I have signed out the follow up of any pending tests (i.e. labs, radiological studies) to the ACP.  I have communicated the patient’s plan of care and disposition with the ACP.  The history, relevant review of systems, past medical and surgical history, medical decision making, and physical examination was documented by the scribe in my presence and I attest to the accuracy of the documentation.

## 2021-07-19 LAB
HAV IGM SER-ACNC: SIGNIFICANT CHANGE UP
HBV CORE IGM SER-ACNC: SIGNIFICANT CHANGE UP
HBV SURFACE AG SER-ACNC: SIGNIFICANT CHANGE UP
HCV AB S/CO SERPL IA: 0.08 S/CO — SIGNIFICANT CHANGE UP (ref 0–0.99)
HCV AB SERPL-IMP: SIGNIFICANT CHANGE UP
SARS-COV-2 RNA SPEC QL NAA+PROBE: SIGNIFICANT CHANGE UP

## 2022-05-13 ENCOUNTER — APPOINTMENT (OUTPATIENT)
Dept: FAMILY MEDICINE | Facility: CLINIC | Age: 42
End: 2022-05-13
Payer: COMMERCIAL

## 2022-05-13 DIAGNOSIS — F41.9 ANXIETY DISORDER, UNSPECIFIED: ICD-10-CM

## 2022-05-13 DIAGNOSIS — F32.A ANXIETY DISORDER, UNSPECIFIED: ICD-10-CM

## 2022-05-13 PROCEDURE — 99213 OFFICE O/P EST LOW 20 MIN: CPT | Mod: 95

## 2022-05-13 RX ORDER — CYCLOBENZAPRINE HYDROCHLORIDE 10 MG/1
10 TABLET, FILM COATED ORAL
Qty: 1 | Refills: 0 | Status: COMPLETED | COMMUNITY
Start: 2021-02-09 | End: 2022-05-13

## 2022-05-13 RX ORDER — MELOXICAM 15 MG/1
15 TABLET ORAL DAILY
Qty: 1 | Refills: 0 | Status: COMPLETED | COMMUNITY
Start: 2021-02-09 | End: 2022-05-13

## 2022-05-13 RX ORDER — NIRMATRELVIR AND RITONAVIR 300-100 MG
20 X 150 MG & KIT ORAL
Qty: 1 | Refills: 0 | Status: ACTIVE | COMMUNITY
Start: 2022-05-13 | End: 1900-01-01

## 2022-05-13 RX ORDER — FLASH GLUCOSE SCANNING READER
EACH MISCELLANEOUS
Qty: 1 | Refills: 0 | Status: COMPLETED | COMMUNITY
Start: 2020-07-17 | End: 2022-05-13

## 2022-05-13 RX ORDER — QUETIAPINE FUMARATE 100 MG/1
100 TABLET ORAL
Qty: 30 | Refills: 0 | Status: COMPLETED | COMMUNITY
Start: 2020-06-20 | End: 2022-05-13

## 2022-05-13 RX ORDER — QUETIAPINE FUMARATE 25 MG/1
25 TABLET ORAL
Qty: 60 | Refills: 0 | Status: COMPLETED | COMMUNITY
Start: 2020-04-04 | End: 2022-05-13

## 2022-05-13 RX ORDER — LAMOTRIGINE 100 MG/1
100 TABLET ORAL
Refills: 0 | Status: ACTIVE | COMMUNITY
Start: 2022-05-13

## 2022-05-13 RX ORDER — LANCETS
EACH MISCELLANEOUS
Qty: 100 | Refills: 0 | Status: COMPLETED | COMMUNITY
Start: 2020-07-17 | End: 2022-05-13

## 2022-05-13 RX ORDER — ESCITALOPRAM OXALATE 10 MG/1
10 TABLET ORAL
Qty: 30 | Refills: 0 | Status: ACTIVE | COMMUNITY
Start: 2022-05-13

## 2022-05-13 RX ORDER — FLASH GLUCOSE SENSOR
KIT MISCELLANEOUS
Qty: 2 | Refills: 0 | Status: COMPLETED | COMMUNITY
Start: 2020-07-17 | End: 2022-05-13

## 2022-05-13 RX ORDER — METHYLPREDNISOLONE 4 MG/1
4 TABLET ORAL
Qty: 1 | Refills: 0 | Status: COMPLETED | COMMUNITY
Start: 2021-02-09 | End: 2022-05-13

## 2022-05-13 RX ORDER — BUPROPION HYDROCHLORIDE 300 MG/1
300 TABLET, EXTENDED RELEASE ORAL
Qty: 30 | Refills: 0 | Status: COMPLETED | COMMUNITY
Start: 2020-06-20 | End: 2022-05-13

## 2022-05-13 RX ORDER — DOXYCYCLINE 100 MG/1
100 TABLET, FILM COATED ORAL
Qty: 20 | Refills: 0 | Status: COMPLETED | COMMUNITY
Start: 2020-08-18 | End: 2022-05-13

## 2022-05-13 RX ORDER — INSULIN GLARGINE 100 [IU]/ML
100 INJECTION, SOLUTION SUBCUTANEOUS
Qty: 3 | Refills: 0 | Status: COMPLETED | COMMUNITY
Start: 2020-07-17 | End: 2022-05-13

## 2022-05-13 RX ORDER — DOXYCYCLINE 100 MG/1
100 TABLET, FILM COATED ORAL
Qty: 14 | Refills: 0 | Status: COMPLETED | COMMUNITY
Start: 2020-08-18 | End: 2022-05-13

## 2022-05-13 RX ORDER — CLONAZEPAM 0.5 MG/1
0.5 TABLET ORAL
Qty: 30 | Refills: 0 | Status: ACTIVE | COMMUNITY
Start: 2022-05-13

## 2022-05-13 RX ORDER — INSULIN LISPRO 100 [IU]/ML
100 INJECTION, SOLUTION INTRAVENOUS; SUBCUTANEOUS
Qty: 3 | Refills: 0 | Status: COMPLETED | COMMUNITY
Start: 2020-07-17 | End: 2022-05-13

## 2022-05-13 NOTE — PHYSICAL EXAM
[No Acute Distress] : no acute distress [Well-Appearing] : well-appearing [Normal Sclera/Conjunctiva] : normal sclera/conjunctiva [Normal Outer Ear/Nose] : the outer ears and nose were normal in appearance [No Respiratory Distress] : no respiratory distress  [No Accessory Muscle Use] : no accessory muscle use [de-identified] : normal pallor

## 2022-05-13 NOTE — PLAN
[FreeTextEntry1] : Covid-19\par - due to medical hx and high risk disease, will send paxlovid. Reviewed potential contraindications and AEs. \par - cont advil/tylenol PRN for sx\par - hydration\par - ED if severe sx\par \par Anxiety/depression\par - reviewed and updated meds

## 2022-05-13 NOTE — HISTORY OF PRESENT ILLNESS
[Home] : at home, [unfilled] , at the time of the visit. [Medical Office: ([facility])___] : at the medical office located in  [FreeTextEntry8] : 41 year old male with pmhx of DM2  presents for positive covid for 1-2 days. Complains of congestion, low grade fever, fatigue, body aches, mild cough. Concerned with his medical hx he is high risk for worsening disease

## 2022-05-13 NOTE — REVIEW OF SYSTEMS
[Fever] : no fever [Chills] : no chills [Fatigue] : no fatigue [Discharge] : no discharge [Vision Problems] : no vision problems [Postnasal Drip] : postnasal drip [Nasal Discharge] : nasal discharge [Sore Throat] : sore throat [Chest Pain] : no chest pain [Palpitations] : no palpitations [Shortness Of Breath] : no shortness of breath [Wheezing] : no wheezing [Cough] : cough [Back Pain] : back pain [Headache] : headache

## 2022-05-19 ENCOUNTER — APPOINTMENT (OUTPATIENT)
Dept: FAMILY MEDICINE | Facility: CLINIC | Age: 42
End: 2022-05-19
Payer: COMMERCIAL

## 2022-05-19 DIAGNOSIS — U07.1 COVID-19: ICD-10-CM

## 2022-05-19 PROCEDURE — 99213 OFFICE O/P EST LOW 20 MIN: CPT | Mod: 95

## 2022-05-19 NOTE — PLAN
[FreeTextEntry1] : Covid-19\par - still symptomatic. \par - Pt should remain out of work for another week to recover. Will provide letter \par - ED if SOB or worsening sx

## 2022-05-19 NOTE — HISTORY OF PRESENT ILLNESS
[Home] : at home, [unfilled] , at the time of the visit. [Medical Office: ([facility])___] : at the medical office located in  [Verbal consent obtained from patient] : the patient, [unfilled] [FreeTextEntry8] : 41 year old male following up on his Covid-19. States he feels better compared to last week but still sympotmatic. Has productive cough, fatigue, aches and headaches. He has a physical job and will no be able to return yet and requires a note fro Covid leave. Breathing is stable. He has a pulse ox at home he checks.

## 2022-05-28 ENCOUNTER — EMERGENCY (EMERGENCY)
Facility: HOSPITAL | Age: 42
LOS: 0 days | Discharge: HOME | End: 2022-05-29
Attending: EMERGENCY MEDICINE
Payer: COMMERCIAL

## 2022-05-28 VITALS
HEIGHT: 69 IN | RESPIRATION RATE: 18 BRPM | TEMPERATURE: 98 F | OXYGEN SATURATION: 94 % | HEART RATE: 98 BPM | WEIGHT: 164.91 LBS | DIASTOLIC BLOOD PRESSURE: 92 MMHG | SYSTOLIC BLOOD PRESSURE: 120 MMHG

## 2022-05-28 DIAGNOSIS — F10.120 ALCOHOL ABUSE WITH INTOXICATION, UNCOMPLICATED: ICD-10-CM

## 2022-05-28 DIAGNOSIS — Z20.822 CONTACT WITH AND (SUSPECTED) EXPOSURE TO COVID-19: ICD-10-CM

## 2022-05-28 DIAGNOSIS — Z90.89 ACQUIRED ABSENCE OF OTHER ORGANS: Chronic | ICD-10-CM

## 2022-05-28 DIAGNOSIS — E11.9 TYPE 2 DIABETES MELLITUS WITHOUT COMPLICATIONS: ICD-10-CM

## 2022-05-28 DIAGNOSIS — F41.9 ANXIETY DISORDER, UNSPECIFIED: ICD-10-CM

## 2022-05-28 DIAGNOSIS — F32.A DEPRESSION, UNSPECIFIED: ICD-10-CM

## 2022-05-28 DIAGNOSIS — R45.851 SUICIDAL IDEATIONS: ICD-10-CM

## 2022-05-28 PROCEDURE — 85025 COMPLETE CBC W/AUTO DIFF WBC: CPT

## 2022-05-28 PROCEDURE — 84443 ASSAY THYROID STIM HORMONE: CPT

## 2022-05-28 PROCEDURE — 96372 THER/PROPH/DIAG INJ SC/IM: CPT

## 2022-05-28 PROCEDURE — 36415 COLL VENOUS BLD VENIPUNCTURE: CPT

## 2022-05-28 PROCEDURE — 71045 X-RAY EXAM CHEST 1 VIEW: CPT

## 2022-05-28 PROCEDURE — 80307 DRUG TEST PRSMV CHEM ANLYZR: CPT

## 2022-05-28 PROCEDURE — 81003 URINALYSIS AUTO W/O SCOPE: CPT

## 2022-05-28 PROCEDURE — 82962 GLUCOSE BLOOD TEST: CPT

## 2022-05-28 PROCEDURE — 99285 EMERGENCY DEPT VISIT HI MDM: CPT | Mod: 25

## 2022-05-28 PROCEDURE — 99283 EMERGENCY DEPT VISIT LOW MDM: CPT

## 2022-05-28 PROCEDURE — 87635 SARS-COV-2 COVID-19 AMP PRB: CPT

## 2022-05-28 PROCEDURE — 80053 COMPREHEN METABOLIC PANEL: CPT

## 2022-05-28 PROCEDURE — 93005 ELECTROCARDIOGRAM TRACING: CPT

## 2022-05-28 NOTE — ED ADULT TRIAGE NOTE - CHIEF COMPLAINT QUOTE
pt BIBA after many drinks tonight. as per EMS, wife called because pt was vomiting at home. upon arrival to ED, pt without complaints.

## 2022-05-28 NOTE — ED ADULT NURSE NOTE - NSIMPLEMENTINTERV_GEN_ALL_ED
Implemented All Fall Risk Interventions:  [place] to call system. Call bell, personal items and telephone within reach. Instruct patient to call for assistance. Room bathroom lighting operational. Non-slip footwear when patient is off stretcher. Physically safe environment: no spills, clutter or unnecessary equipment. Stretcher in lowest position, wheels locked, appropriate side rails in place. Provide visual cue, wrist band, yellow gown, etc. Monitor gait and stability. Monitor for mental status changes and reorient to person, place, and time. Review medications for side effects contributing to fall risk. Reinforce activity limits and safety measures with patient and family.

## 2022-05-28 NOTE — ED ADULT NURSE NOTE - OBJECTIVE STATEMENT
Pt A&Ox3-4, visibly drunk was BIBEMS after drinking at a family's party. Patient states he snuck whiskey into the party and mixed it with redbull. Patient states he has a Hx of diabetes and has not drank in a few weeks. As per EMS, wife called because pt was vomiting at home. Patient denies vomiting. Patient denies abdominal pain, nausea. or SOB.

## 2022-05-29 VITALS
HEART RATE: 82 BPM | TEMPERATURE: 98 F | RESPIRATION RATE: 18 BRPM | SYSTOLIC BLOOD PRESSURE: 118 MMHG | OXYGEN SATURATION: 99 % | DIASTOLIC BLOOD PRESSURE: 78 MMHG

## 2022-05-29 DIAGNOSIS — F10.920 ALCOHOL USE, UNSPECIFIED WITH INTOXICATION, UNCOMPLICATED: ICD-10-CM

## 2022-05-29 DIAGNOSIS — F39 UNSPECIFIED MOOD [AFFECTIVE] DISORDER: ICD-10-CM

## 2022-05-29 LAB
ALBUMIN SERPL ELPH-MCNC: 4 G/DL — SIGNIFICANT CHANGE UP (ref 3.3–5)
ALP SERPL-CCNC: 99 U/L — SIGNIFICANT CHANGE UP (ref 40–120)
ALT FLD-CCNC: 76 U/L — SIGNIFICANT CHANGE UP (ref 12–78)
ANION GAP SERPL CALC-SCNC: 13 MMOL/L — SIGNIFICANT CHANGE UP (ref 5–17)
APAP SERPL-MCNC: <2 UG/ML — LOW (ref 10–30)
APPEARANCE UR: CLEAR — SIGNIFICANT CHANGE UP
AST SERPL-CCNC: 58 U/L — HIGH (ref 15–37)
BASOPHILS # BLD AUTO: 0.07 K/UL — SIGNIFICANT CHANGE UP (ref 0–0.2)
BASOPHILS NFR BLD AUTO: 0.4 % — SIGNIFICANT CHANGE UP (ref 0–2)
BILIRUB SERPL-MCNC: 0.4 MG/DL — SIGNIFICANT CHANGE UP (ref 0.2–1.2)
BILIRUB UR-MCNC: NEGATIVE — SIGNIFICANT CHANGE UP
BUN SERPL-MCNC: 23 MG/DL — SIGNIFICANT CHANGE UP (ref 7–23)
CALCIUM SERPL-MCNC: 8.7 MG/DL — SIGNIFICANT CHANGE UP (ref 8.5–10.1)
CHLORIDE SERPL-SCNC: 106 MMOL/L — SIGNIFICANT CHANGE UP (ref 96–108)
CO2 SERPL-SCNC: 23 MMOL/L — SIGNIFICANT CHANGE UP (ref 22–31)
COLOR SPEC: YELLOW — SIGNIFICANT CHANGE UP
CREAT SERPL-MCNC: 0.78 MG/DL — SIGNIFICANT CHANGE UP (ref 0.5–1.3)
DIFF PNL FLD: NEGATIVE — SIGNIFICANT CHANGE UP
EGFR: 113 ML/MIN/1.73M2 — SIGNIFICANT CHANGE UP
EOSINOPHIL # BLD AUTO: 0.04 K/UL — SIGNIFICANT CHANGE UP (ref 0–0.5)
EOSINOPHIL NFR BLD AUTO: 0.2 % — SIGNIFICANT CHANGE UP (ref 0–6)
ETHANOL SERPL-MCNC: 181 MG/DL — HIGH (ref 0–10)
GLUCOSE SERPL-MCNC: 241 MG/DL — HIGH (ref 70–99)
GLUCOSE UR QL: 1000 MG/DL
HCT VFR BLD CALC: 43.9 % — SIGNIFICANT CHANGE UP (ref 39–50)
HGB BLD-MCNC: 15.5 G/DL — SIGNIFICANT CHANGE UP (ref 13–17)
IMM GRANULOCYTES NFR BLD AUTO: 0.5 % — SIGNIFICANT CHANGE UP (ref 0–1.5)
KETONES UR-MCNC: ABNORMAL
LEUKOCYTE ESTERASE UR-ACNC: NEGATIVE — SIGNIFICANT CHANGE UP
LYMPHOCYTES # BLD AUTO: 1.38 K/UL — SIGNIFICANT CHANGE UP (ref 1–3.3)
LYMPHOCYTES # BLD AUTO: 7.7 % — LOW (ref 13–44)
MCHC RBC-ENTMCNC: 29.8 PG — SIGNIFICANT CHANGE UP (ref 27–34)
MCHC RBC-ENTMCNC: 35.3 GM/DL — SIGNIFICANT CHANGE UP (ref 32–36)
MCV RBC AUTO: 84.4 FL — SIGNIFICANT CHANGE UP (ref 80–100)
MONOCYTES # BLD AUTO: 0.26 K/UL — SIGNIFICANT CHANGE UP (ref 0–0.9)
MONOCYTES NFR BLD AUTO: 1.4 % — LOW (ref 2–14)
NEUTROPHILS # BLD AUTO: 16.15 K/UL — HIGH (ref 1.8–7.4)
NEUTROPHILS NFR BLD AUTO: 89.8 % — HIGH (ref 43–77)
NITRITE UR-MCNC: NEGATIVE — SIGNIFICANT CHANGE UP
PCP SPEC-MCNC: SIGNIFICANT CHANGE UP
PH UR: 5 — SIGNIFICANT CHANGE UP (ref 5–8)
PLATELET # BLD AUTO: 367 K/UL — SIGNIFICANT CHANGE UP (ref 150–400)
POTASSIUM SERPL-MCNC: 3.9 MMOL/L — SIGNIFICANT CHANGE UP (ref 3.5–5.3)
POTASSIUM SERPL-SCNC: 3.9 MMOL/L — SIGNIFICANT CHANGE UP (ref 3.5–5.3)
PROT SERPL-MCNC: 7.6 GM/DL — SIGNIFICANT CHANGE UP (ref 6–8.3)
PROT UR-MCNC: NEGATIVE — SIGNIFICANT CHANGE UP
RBC # BLD: 5.2 M/UL — SIGNIFICANT CHANGE UP (ref 4.2–5.8)
RBC # FLD: 12.3 % — SIGNIFICANT CHANGE UP (ref 10.3–14.5)
SALICYLATES SERPL-MCNC: <1.7 MG/DL — LOW (ref 2.8–20)
SARS-COV-2 RNA SPEC QL NAA+PROBE: SIGNIFICANT CHANGE UP
SODIUM SERPL-SCNC: 142 MMOL/L — SIGNIFICANT CHANGE UP (ref 135–145)
SP GR SPEC: 1.01 — SIGNIFICANT CHANGE UP (ref 1.01–1.02)
TSH SERPL-MCNC: 0.4 UU/ML — SIGNIFICANT CHANGE UP (ref 0.34–4.82)
UROBILINOGEN FLD QL: NEGATIVE — SIGNIFICANT CHANGE UP
WBC # BLD: 17.99 K/UL — HIGH (ref 3.8–10.5)
WBC # FLD AUTO: 17.99 K/UL — HIGH (ref 3.8–10.5)

## 2022-05-29 PROCEDURE — 99283 EMERGENCY DEPT VISIT LOW MDM: CPT

## 2022-05-29 PROCEDURE — 93010 ELECTROCARDIOGRAM REPORT: CPT

## 2022-05-29 PROCEDURE — 71045 X-RAY EXAM CHEST 1 VIEW: CPT | Mod: 26

## 2022-05-29 RX ORDER — HALOPERIDOL DECANOATE 100 MG/ML
5 INJECTION INTRAMUSCULAR ONCE
Refills: 0 | Status: COMPLETED | OUTPATIENT
Start: 2022-05-29 | End: 2022-05-29

## 2022-05-29 RX ORDER — SODIUM CHLORIDE 9 MG/ML
1000 INJECTION INTRAMUSCULAR; INTRAVENOUS; SUBCUTANEOUS ONCE
Refills: 0 | Status: COMPLETED | OUTPATIENT
Start: 2022-05-29 | End: 2022-05-29

## 2022-05-29 RX ORDER — INSULIN GLARGINE 100 [IU]/ML
15 INJECTION, SOLUTION SUBCUTANEOUS ONCE
Refills: 0 | Status: COMPLETED | OUTPATIENT
Start: 2022-05-29 | End: 2022-05-29

## 2022-05-29 RX ORDER — DIPHENHYDRAMINE HCL 50 MG
50 CAPSULE ORAL ONCE
Refills: 0 | Status: COMPLETED | OUTPATIENT
Start: 2022-05-29 | End: 2022-05-29

## 2022-05-29 RX ADMIN — HALOPERIDOL DECANOATE 5 MILLIGRAM(S): 100 INJECTION INTRAMUSCULAR at 00:20

## 2022-05-29 RX ADMIN — Medication 50 MILLIGRAM(S): at 00:20

## 2022-05-29 RX ADMIN — INSULIN GLARGINE 15 UNIT(S): 100 INJECTION, SOLUTION SUBCUTANEOUS at 00:50

## 2022-05-29 RX ADMIN — Medication 2 MILLIGRAM(S): at 00:20

## 2022-05-29 NOTE — ED BEHAVIORAL HEALTH ASSESSMENT NOTE - SUMMARY
Patient a 40 y/o  male, domiciled with wife and 2 children D-5 and S-3, employed with [employer], hx of Mood D/O, no prior Psychiatric Hospitalization, no prior SI/SA, hx of Infrequent alcohol use, no other drug abuse hx, medically has DM was BIB/EMS as he passed out from Alcohol intoxication.    Patient  in bed, AAOX3, endorses that he was partying where he drank [place] with Whiskey and then is not aware what happened to him and later founds himself at ED at . He is aware that he is at . He has Psychiatric diagnosis of Mood D/O, and takes meds e.g. Klonopin 0.5 mg TID as needed with Lexapro 20 mg daily, and Lamictal 50 mg daily. He never tried to commit suicide, no drug abuse hx, denied A/V/H or paranoid beliefs. He was not on any rehab for drugs/alcohol as well.    Medically has DM and takes Farxiga 1 tab daily. Spoke with wife Lillian Mattson @ [phone] and she agrees with above and endorses that she has no concerns for safety at this time and to have yogi early appointment with Psychiatrist Dr. Emma Goncalves ASAP preferably with 5 days. Wife to pick him up at 12:00 Noon.    Plan: Discharge pt to home          NO meds          F/U with Dr. Goncalves in 5 days

## 2022-05-29 NOTE — ED ADULT NURSE REASSESSMENT NOTE - NS ED NURSE REASSESS COMMENT FT1
Patient chemically restrained with 5mg Haldol, 2mg Ativan, and 50mg of Benadryl after aggressive behavior. Patient states. "I am trying to make my escape, and I will take down anyone in my way." Verbal deescalation used. Patient visibly aggressive and whispering, "have you seen those guys in the mass shooting? You don't have to be a big person to take people down." Patient also states he is waiting for his two kids to graduate high school to kill himself. Patient moved to [facility] for safety, pending psych evaluation.

## 2022-05-29 NOTE — ED PROVIDER NOTE - OBJECTIVE STATEMENT
42 yo male biba from homefor alcohol intoxication. Pt states he has ho dm. He states he is here because "alcohol and my family do not mix" 5

## 2022-05-29 NOTE — ED ADULT NURSE REASSESSMENT NOTE - NS ED NURSE REASSESS COMMENT FT1
RN assessed. Patient safety maintained with PSA at the bedside. Patient sleeping comfortably. No signs of harm noted.

## 2022-05-29 NOTE — ED PROVIDER NOTE - PROGRESS NOTE DETAILS
pt stated to nurse he was going to hurt himself and leave but he does not want to leave and is asking to be restrained. Pt was moved to [facility] and given haldol, ativan and benadryl. his nightime dose of lantus was ordered. Pt is unable to be presented to psych due to intoxication and subsequent sedation due to agitation. will so to am doctor in the morning penidng psych evaluation and disposition MILAN Hanley DO pt signed out to me pending psych - psych saw pt and cleared for DC. pt with elevated wbc. I spoke with patient. states he doesn't have any symptoms of fever sob dysuria abd pain. was recently dx with COVID a few weeks ago - took paxlovid. has mild residual cough. cxr unremarkable. elevated wbc could be stress reaction or ? from paxlovid. no headache neck pain or other symptoms. will dc with follow up and strict return precautions. Syed Ron M.D., Attending Physician

## 2022-05-29 NOTE — ED BEHAVIORAL HEALTH ASSESSMENT NOTE - VIOLENCE RISK FACTORS:
[facility] Daily Progress     Patient Name: Hugo Robles  Date: 3/8/2019   Visit #: 5/12  PTA visit #:    Referral Diagnosis:Neck pain on left side, myofascial pain   Referring provider: Ellie Foster C*  Visit Diagnosis:     ICD-10-CM    1. Neck pain on left side M54.2    2. Musculoskeletal disorder involving upper trapezius muscle M53.82      Precautions: palpitations. Tachycardia, presyncope, positive right Radha reflex, pt wears right heel lift and has chronic left SI joint pain with iliac crest discrepancy    Assessment:   Pt reports 15%-20% decrease in pain this week, no HAs or flare-ups.  Focus on manual therapy today with nerve glides.     From Evaluation: Pt is a 32 y.o. year old male with chronic mild to moderate left-sided neck pain C3-4 region with pain into left trapezius.  Patient has difficulty with studying, reading, writing and lifting heavy upper body weights. Findings are consistent with facet mediated pain and cervical/upper back myofascial pain.  Pt presents with positive median and ulnar nerve testing, along with painful AROM SB and Rot to left.    Patient demonstrates understanding/independence with home program.  Patient is benefitting from skilled physical therapy and is making steady progress toward functional goals.  Patient is appropriate to continue with skilled physical therapy intervention, as indicated by initial plan of care.    Goal Status:  Pt. will demonstrate/verbalize independence in self-management of condition in : 12 weeks  Pt. will be independent with home exercise program in : 6 weeks  Pt. will improve posture : and maintain posture for;Comment;in 6 weeks  Comment:: studying with lumbar support and less neck pain   Patient Turn Head: for other;with full ROM;wiith no pain;in 12 weeks    Pt will: return to lifting heavy weights without pain in 12 weeks to allow for increased QOL.       Plan / Patient Education:     Continue with initial plan of  care.  Progress with home program as tolerated.     Plan for next session: Ask about return to swim, chin tuck and lift review, check nerve glides, STM, mobs     Subjective:   Pt was sore after last session for 48-72 hours  This week pt has been feeling better 15-20% better  L shoulder had increased soreness   Backed off of UE weight lifting.    No HAs.     Pain Rating: not assessed      Objective:     Exercises:  Exercise #1: chin tuck in sitting and supine - chin tuck and lift 10 sec x5-8 reps   Comment #1: foam roller: horizontal and vertical, chest openner and upper back self-massage and facet joint mobility  Exercise #2: Nerve glides: median nerve tensioners and rockers, ulnar nerve butterfly x10-15   Comment #2: SNAGS   Exercise #3: I's, W's in prone 10 sec hold x10 - on therapeutic ball added T's   Comment #3: Serratus: punches with 12# 2X10-15, push up + in plank, with and without bosu (1/2 push-up with push-up +)   Exercise #4: Dynamic plank on ex's ball and on bosu, plank with weight to avoid wrist pain   Comment #4: Discussed RC strengthing pt is performing IR, ER with band - added scaption with 2# high repetition   Cervical ROM:             Date: 1/28/2019  3/8/19     *Indicate scale AROM AROM AROM   Cervical Flexion (45) WLF       Cervical Extension (45) WFL - pain   WFL slightly less pain        Right Left Right Left Right Left   Cervical Sidebending (45) 45 pain on L  38 pain on L  50 tightness on L   55 tightness on R - slight pain along spine, tingling       Cervical Rotation (60-80)  70 60 pain on L  76  68 no P         Chin tuck and lift: 20 seconds - improvement from last session       Treatment Today     TREATMENT MINUTES COMMENTS   Evaluation     Self-care/ Home management  Heat 1x a day for 20 min    Manual therapy 45 Supine: cervical spinal erectors (hypertonic on L upper trap along spine), SCM, scalene, occipital release   Prone: infraspinatus bilaterally, L supraspinatus, UT, rhomoids      Neuromuscular Re-education 10 Nerve glides: median and ulnar bilaterally (L>R)  x15 (less reps and intensity due to increased soreness after last session)    Therapeutic Activity     Therapeutic Exercises  See ex's flow sheet - encouraged pt to perform chin tuck and lift   No new ex's given today.    Gait training     Modality__________________                Total 55    Blank areas are intentional and mean the treatment did not include these items.       Edilma Mcmullen, PT, DPT  3/8/2019   None Known

## 2022-05-29 NOTE — ED BEHAVIORAL HEALTH ASSESSMENT NOTE - HPI (INCLUDE ILLNESS QUALITY, SEVERITY, DURATION, TIMING, CONTEXT, MODIFYING FACTORS, ASSOCIATED SIGNS AND SYMPTOMS)
Patient a 40 y/o  male, domiciled with wife and 2 children D-5 and S-3, employed with [employer], hx of Mood D/O, no prior Psychiatric Hospitalization, no prior SI/SA, hx of Infrequent alcohol use, no other drug abuse hx, medically has DM was BIB/EMS as he passed out from Alcohol intoxication.    Patient  in bed, AAOX3, endorses that he was partying where he drank [place] with Whiskey and then is not aware what happened to him and later founds himself at ED at . He is aware that he is at . He has Psychiatric diagnosis of Mood D/O, and takes meds e.g. Klonopin 0.5 mg TID as needed with Lexapro 20 mg daily, and Lamictal 50 mg daily. He never tried to commit suicide, no drug abuse hx, denied A/V/H or paranoid beliefs. He was not on any rehab for drugs/alcohol as well.    Medically has DM and takes Farxiga 1 tab daily. Spoke with wife Lillian Mattson @ [phone] and she agrees with above and endorses that she has no concerns for safety at this time and to have yogi early appointment with Psychiatrist Dr. Emma GIBSON preferably with 5 days. Wife to pick him up at 12:00 Noon.

## 2022-05-29 NOTE — ED PROVIDER NOTE - PHYSICAL EXAMINATION
Gen:  Well appearing in NAD, AOB  Head:  NC/AT  HEENT: pupils perrl,no pharyngeal erythema, uvula midline  Cardiac: S1S2, RRR  Abd: Soft, non tender  Resp: No distress, CTA   musculoskeletal:: no deformities, no swelling, strength +5/+5  Skin: warm and dry as visualized, no rashes  Neuro: LACY, aao x 4  Psych:alert, cooperative, appropriate mood and affect for situation

## 2022-05-29 NOTE — ED PROVIDER NOTE - NSFOLLOWUPINSTRUCTIONS_ED_ALL_ED_FT
1. return for worsening symptoms or anything concerning to you  2. take all home meds as prescribed  3. follow up with your pmd call to make an appointment  4. your wbc was elevated - follow up with your pmd    Alcohol Use Disorder    WHAT YOU NEED TO KNOW:    Alcohol use disorder (AUD) is problem drinking. AUD includes alcohol abuse and alcohol dependency.     DISCHARGE INSTRUCTIONS:    Seek care immediately if:     Your heart is beating faster than usual.      You have hallucinations.      You cannot remember what happens while you are drinking.      You have seizures.    Contact your healthcare provider if:     You are anxious and have nausea.      Your hands are shaky and you are sweating heavily.      You have questions or concerns about your condition or care.    Follow up with your healthcare provider as directed: Do not try to stop drinking on your own. Your healthcare provider may need to help you withdraw from alcohol safely. He may need to admit you to the hospital. You may also need any of the following treatments:    Medicines to decrease your craving for alcohol      Support groups such as Alcoholics Anonymous       Therapy from a psychiatrist or psychologist       Admission to an inpatient facility for treatment for severe AUD    Interested in discussing options to reduce your alcohol or drug use?      [facility]: [phone]   [facility] Substance Abuse Services: [phone], option #2   Methadone Maintenance & Ambulatory Opiate Detox: [phone]  [facility]: [phone]  [facility]: [phone]  [facility]: [phone]    [facility]: [phone], option #2   [facility]: [phone]    [facility]: [phone]    [facility] Central Intake: [phone]  [facility] Chemical Dependency/Ancillary Withdrawal: [phone]  [facility] Methadone Maintenance: [phone]    [facility]: [phone]  [facility] Addiction Treatment Services: [phone]    [facility]: 6-389-7-HOPENY    [facility] Office of Alcoholism and Substance Abuse Services (OASAS): https://www.oasas.[place].gov/providerdirectory/  [facility] for Addiction Services and Psychotherapy Interventions Research (CASPIR)  www.caspirnyc.org     Interested in discussing options to reduce your tobacco use?    [facility] for Tobacco Control:  [phone]  [facility] QUITLINE: 0-089-UG-QUITS ([phone])    Interested in learning more about substance use?      http://rethinkingdrinking.niaaa.nih.gov   https://www.drugabuse.gov/patients-families     Learn more about opioid overdose prevention programs in [facility]:  http://www.health.ny.gov/diseases/aids/general/opioid_overdose_prevention/

## 2022-05-29 NOTE — ED ADULT NURSE REASSESSMENT NOTE - NS ED NURSE REASSESS COMMENT FT1
Received pt from prior RN, pt is a&0x4, airway patent, does not show any s/s of respiratory distress, breathing is unlabored, color is culturally appropriate,  vs are WNL, 1:1 d/c at 10:30am pt belongings and valuables were returned, pt waiting to be picked up

## 2022-05-29 NOTE — ED PROVIDER NOTE - PATIENT PORTAL LINK FT
You can access the FollowMyHealth Patient Portal offered by [facility] by registering at the following website: [email or web address]. By joining [employer]’s FollowMyHealth portal, you will also be able to view your health information using other applications (apps) compatible with our system.

## 2022-05-29 NOTE — ED ADULT NURSE REASSESSMENT NOTE - NS ED NURSE REASSESS COMMENT FT1
assumed care of pt from Daniella HICKS. Pt is awake and responsive, resting comfortably with 1:1 at bedside. Pt was wanded by security. Pending psych eval. pt with no other complaints at this time. Safety is maintained.

## 2023-01-09 ENCOUNTER — APPOINTMENT (OUTPATIENT)
Dept: UROLOGY | Facility: CLINIC | Age: 43
End: 2023-01-09
Payer: COMMERCIAL

## 2023-01-09 VITALS
WEIGHT: 160 LBS | HEART RATE: 71 BPM | BODY MASS INDEX: 22.9 KG/M2 | SYSTOLIC BLOOD PRESSURE: 124 MMHG | OXYGEN SATURATION: 99 % | DIASTOLIC BLOOD PRESSURE: 74 MMHG | HEIGHT: 70 IN

## 2023-01-09 DIAGNOSIS — N40.1 BENIGN PROSTATIC HYPERPLASIA WITH LOWER URINARY TRACT SYMPMS: ICD-10-CM

## 2023-01-09 DIAGNOSIS — N13.8 BENIGN PROSTATIC HYPERPLASIA WITH LOWER URINARY TRACT SYMPMS: ICD-10-CM

## 2023-01-09 PROCEDURE — 99214 OFFICE O/P EST MOD 30 MIN: CPT

## 2023-01-09 RX ORDER — TAMSULOSIN HYDROCHLORIDE 0.4 MG/1
0.4 CAPSULE ORAL
Qty: 30 | Refills: 11 | Status: ACTIVE | COMMUNITY
Start: 2023-01-09 | End: 1900-01-01

## 2023-01-09 NOTE — HISTORY OF PRESENT ILLNESS
[FreeTextEntry1] : 42M presents today for a checkup. Pt has noted an increase in lower urinary tract symptoms. He notes some frequency and slowing of the stream. He is not medicated for prostatism at this time.

## 2023-01-09 NOTE — END OF VISIT
[FreeTextEntry3] : Pt will trial Tamsulosin. Labs are sent. He will follow up with a TRUSP in 2 weeks.

## 2023-01-10 LAB
APPEARANCE: CLEAR
BACTERIA: NEGATIVE
BILIRUBIN URINE: NEGATIVE
BLOOD URINE: NEGATIVE
COLOR: NORMAL
GLUCOSE QUALITATIVE U: ABNORMAL
HYALINE CASTS: 1 /LPF
KETONES URINE: NEGATIVE
LEUKOCYTE ESTERASE URINE: NEGATIVE
MICROSCOPIC-UA: NORMAL
NITRITE URINE: NEGATIVE
PH URINE: 6.5
PROTEIN URINE: NEGATIVE
PSA SERPL-MCNC: 1.63 NG/ML
RED BLOOD CELLS URINE: 0 /HPF
SPECIFIC GRAVITY URINE: 1.02
SQUAMOUS EPITHELIAL CELLS: 0 /HPF
UROBILINOGEN URINE: NORMAL
WHITE BLOOD CELLS URINE: 0 /HPF

## 2023-01-11 LAB — URINE CYTOLOGY: NORMAL

## 2023-02-08 ENCOUNTER — APPOINTMENT (OUTPATIENT)
Dept: UROLOGY | Facility: CLINIC | Age: 43
End: 2023-02-08

## 2023-04-28 NOTE — DISCHARGE NOTE NURSING/CASE MANAGEMENT/SOCIAL WORK - PATIENT PORTAL LINK FT
Patient left message requesting a refill for Metoprolol to be sent to [facility]. Pending RX to Provider to be sent to pharmacy. You can access the FollowMyHealth Patient Portal offered by [facility] by registering at the following website: [email or web address]. By joining [email or web address]’s FollowMyHealth portal, you will also be able to view your health information using other applications (apps) compatible with our system.

## 2023-05-08 NOTE — ED ADULT TRIAGE NOTE - BSA (M2)
Family History   Problem Relation Age of Onset    Heart Attack Maternal Cousin        Social History     Tobacco Use    Smoking status: Never    Smokeless tobacco: Never   Substance Use Topics    Alcohol use: Yes     Comment: rare         Prior to Visit Medications    Medication Sig Taking? Authorizing Provider   labetalol (NORMODYNE) 200 MG tablet TAKE ONE TABLET BY MOUTH TWICE A DAY Yes Teri Hodgkin, PA-C   chlorthalidone (HYGROTON) 25 MG tablet Take 0.5 tablets by mouth daily TAKE ONE TABLET BY MOUTH DAILY Yes Teri Hodgkin, PA-C   vitamin D (ERGOCALCIFEROL) 1.25 MG (38526 UT) CAPS capsule TAKE ONE CAPSULE BY MOUTH ONCE WEEKLY Yes Gera Mcdermott MD   losartan (COZAAR) 50 MG tablet TAKE ONE TABLET BY MOUTH DAILY Yes Alvan Schaumann, APRN - CNP   amLODIPine (NORVASC) 10 MG tablet TAKE ONE TABLET BY MOUTH DAILY Yes Gera Mcdermott MD   simvastatin (ZOCOR) 20 MG tablet TAKE ONE TABLET BY MOUTH ONCE NIGHTLY Yes Ana Marshall MD   tadalafil (CIALIS) 5 MG tablet TAKE ONE TABLET BY MOUTH DAILY Yes Gera Mcdermott MD   cloNIDine (CATAPRES) 0.1 MG tablet TAKE ONE TABLET BY MOUTH TWICE A DAY Yes Gera Mcdermott MD   tiZANidine (ZANAFLEX) 2 MG tablet Take 1 tablet by mouth nightly as needed (back spasm) Yes Gera Mcdermott MD   fluticasone (FLONASE) 50 MCG/ACT nasal spray 2 sprays by Each Nostril route daily Yes AMINTA Pimentel CNP   tadalafil (CIALIS) 20 MG tablet Take 1 tablet by mouth daily as needed for Erectile Dysfunction Yes Gera Mcdermott MD       No Known Allergies      Subjective:      Review of Systems   Gastrointestinal:  Positive for vomiting. Neurological:  Positive for light-headedness and headaches. Objective:     Physical Exam  Constitutional:       Comments: Deferred exam.  Sent to [facility]. MEDICAL DECISION MAKING Assessment/Plan:     Kalani Mcgee was seen today for headache.     Diagnoses and all orders for this visit:    Closed head injury, initial encounter  -     CT HEAD WO
1.83

## 2023-10-08 ENCOUNTER — EMERGENCY (EMERGENCY)
Facility: HOSPITAL | Age: 43
LOS: 0 days | Discharge: HOME | End: 2023-10-09
Attending: EMERGENCY MEDICINE
Payer: COMMERCIAL

## 2023-10-08 VITALS
RESPIRATION RATE: 20 BRPM | HEART RATE: 98 BPM | HEIGHT: 69 IN | TEMPERATURE: 98 F | OXYGEN SATURATION: 100 % | DIASTOLIC BLOOD PRESSURE: 98 MMHG | WEIGHT: 149.91 LBS | SYSTOLIC BLOOD PRESSURE: 141 MMHG

## 2023-10-08 DIAGNOSIS — E11.9 TYPE 2 DIABETES MELLITUS WITHOUT COMPLICATIONS: ICD-10-CM

## 2023-10-08 DIAGNOSIS — F41.9 ANXIETY DISORDER, UNSPECIFIED: ICD-10-CM

## 2023-10-08 DIAGNOSIS — Z90.89 ACQUIRED ABSENCE OF OTHER ORGANS: Chronic | ICD-10-CM

## 2023-10-08 DIAGNOSIS — Z79.4 LONG TERM (CURRENT) USE OF INSULIN: ICD-10-CM

## 2023-10-08 DIAGNOSIS — R45.851 SUICIDAL IDEATIONS: ICD-10-CM

## 2023-10-08 DIAGNOSIS — F10.920 ALCOHOL USE, UNSPECIFIED WITH INTOXICATION, UNCOMPLICATED: ICD-10-CM

## 2023-10-08 DIAGNOSIS — R45.1 RESTLESSNESS AND AGITATION: ICD-10-CM

## 2023-10-08 DIAGNOSIS — F32.A DEPRESSION, UNSPECIFIED: ICD-10-CM

## 2023-10-08 DIAGNOSIS — Z20.822 CONTACT WITH AND (SUSPECTED) EXPOSURE TO COVID-19: ICD-10-CM

## 2023-10-08 DIAGNOSIS — I45.10 UNSPECIFIED RIGHT BUNDLE-BRANCH BLOCK: ICD-10-CM

## 2023-10-08 DIAGNOSIS — Z90.89 ACQUIRED ABSENCE OF OTHER ORGANS: ICD-10-CM

## 2023-10-08 LAB
ALBUMIN SERPL ELPH-MCNC: 3.8 G/DL — SIGNIFICANT CHANGE UP (ref 3.3–5)
ALP SERPL-CCNC: 124 U/L — HIGH (ref 40–120)
ALT FLD-CCNC: 54 U/L — SIGNIFICANT CHANGE UP (ref 12–78)
ANION GAP SERPL CALC-SCNC: 5 MMOL/L — SIGNIFICANT CHANGE UP (ref 5–17)
AST SERPL-CCNC: 18 U/L — SIGNIFICANT CHANGE UP (ref 15–37)
BILIRUB SERPL-MCNC: 0.3 MG/DL — SIGNIFICANT CHANGE UP (ref 0.2–1.2)
BUN SERPL-MCNC: 12 MG/DL — SIGNIFICANT CHANGE UP (ref 7–23)
CALCIUM SERPL-MCNC: 8.8 MG/DL — SIGNIFICANT CHANGE UP (ref 8.5–10.1)
CHLORIDE SERPL-SCNC: 112 MMOL/L — HIGH (ref 96–108)
CO2 SERPL-SCNC: 24 MMOL/L — SIGNIFICANT CHANGE UP (ref 22–31)
CREAT SERPL-MCNC: 0.84 MG/DL — SIGNIFICANT CHANGE UP (ref 0.5–1.3)
EGFR: 110 ML/MIN/1.73M2 — SIGNIFICANT CHANGE UP
GLUCOSE SERPL-MCNC: 361 MG/DL — HIGH (ref 70–99)
POTASSIUM SERPL-MCNC: 4.1 MMOL/L — SIGNIFICANT CHANGE UP (ref 3.5–5.3)
POTASSIUM SERPL-SCNC: 4.1 MMOL/L — SIGNIFICANT CHANGE UP (ref 3.5–5.3)
PROT SERPL-MCNC: 7.3 GM/DL — SIGNIFICANT CHANGE UP (ref 6–8.3)
SODIUM SERPL-SCNC: 141 MMOL/L — SIGNIFICANT CHANGE UP (ref 135–145)

## 2023-10-08 PROCEDURE — 82550 ASSAY OF CK (CPK): CPT

## 2023-10-08 PROCEDURE — 96374 THER/PROPH/DIAG INJ IV PUSH: CPT

## 2023-10-08 PROCEDURE — 36415 COLL VENOUS BLD VENIPUNCTURE: CPT

## 2023-10-08 PROCEDURE — 81003 URINALYSIS AUTO W/O SCOPE: CPT

## 2023-10-08 PROCEDURE — 0225U NFCT DS DNA&RNA 21 SARSCOV2: CPT

## 2023-10-08 PROCEDURE — 82803 BLOOD GASES ANY COMBINATION: CPT

## 2023-10-08 PROCEDURE — 80053 COMPREHEN METABOLIC PANEL: CPT

## 2023-10-08 PROCEDURE — 82962 GLUCOSE BLOOD TEST: CPT

## 2023-10-08 PROCEDURE — 99285 EMERGENCY DEPT VISIT HI MDM: CPT

## 2023-10-08 PROCEDURE — 85025 COMPLETE CBC W/AUTO DIFF WBC: CPT

## 2023-10-08 PROCEDURE — 93005 ELECTROCARDIOGRAM TRACING: CPT

## 2023-10-08 PROCEDURE — 99285 EMERGENCY DEPT VISIT HI MDM: CPT | Mod: 25

## 2023-10-08 PROCEDURE — 93010 ELECTROCARDIOGRAM REPORT: CPT

## 2023-10-08 PROCEDURE — 80307 DRUG TEST PRSMV CHEM ANLYZR: CPT

## 2023-10-08 PROCEDURE — 96375 TX/PRO/DX INJ NEW DRUG ADDON: CPT

## 2023-10-08 PROCEDURE — 96372 THER/PROPH/DIAG INJ SC/IM: CPT | Mod: XU

## 2023-10-08 PROCEDURE — 82009 KETONE BODYS QUAL: CPT

## 2023-10-08 RX ORDER — DIPHENHYDRAMINE HCL 50 MG
50 CAPSULE ORAL ONCE
Refills: 0 | Status: COMPLETED | OUTPATIENT
Start: 2023-10-08 | End: 2023-10-08

## 2023-10-08 RX ORDER — HALOPERIDOL DECANOATE 100 MG/ML
5 INJECTION INTRAMUSCULAR ONCE
Refills: 0 | Status: DISCONTINUED | OUTPATIENT
Start: 2023-10-08 | End: 2023-10-08

## 2023-10-08 RX ORDER — SODIUM CHLORIDE 9 MG/ML
2000 INJECTION INTRAMUSCULAR; INTRAVENOUS; SUBCUTANEOUS ONCE
Refills: 0 | Status: COMPLETED | OUTPATIENT
Start: 2023-10-08 | End: 2023-10-08

## 2023-10-08 RX ORDER — HALOPERIDOL DECANOATE 100 MG/ML
2.5 INJECTION INTRAMUSCULAR ONCE
Refills: 0 | Status: COMPLETED | OUTPATIENT
Start: 2023-10-08 | End: 2023-10-08

## 2023-10-08 RX ORDER — DIPHENHYDRAMINE HCL 50 MG
50 CAPSULE ORAL ONCE
Refills: 0 | Status: DISCONTINUED | OUTPATIENT
Start: 2023-10-08 | End: 2023-10-08

## 2023-10-08 RX ADMIN — HALOPERIDOL DECANOATE 2.5 MILLIGRAM(S): 100 INJECTION INTRAMUSCULAR at 23:45

## 2023-10-08 RX ADMIN — Medication 2 MILLIGRAM(S): at 23:31

## 2023-10-08 RX ADMIN — SODIUM CHLORIDE 2000 MILLILITER(S): 9 INJECTION INTRAMUSCULAR; INTRAVENOUS; SUBCUTANEOUS at 23:31

## 2023-10-08 RX ADMIN — Medication 50 MILLIGRAM(S): at 23:45

## 2023-10-08 NOTE — ED ADULT TRIAGE NOTE - CHIEF COMPLAINT QUOTE
pt bibems accompanied by [employer] after making threatening statements to wife. per EMS, he took 2 klonopin and has been drinking. pt making threatening statements to staff and being noncompliant in triage. pmhx of DM and depression. 1:1 initiated in triage. pt bibems accompanied by JANNIE downing #[id number] after making threatening statements to wife. per EMS, he took 2 klonopin and has been drinking. pt making threatening statements to staff and being noncompliant in triage. per wife, pt was making SI/HI statements to kill his father with a chainsaw. pmhx of DM and depression. +SI/HI. . 1:1 initiated in triage.

## 2023-10-08 NOTE — ED ADULT TRIAGE NOTE - PATIENT ON (OXYGEN DELIVERY METHOD)
Maximiliano Hoyt is a 41 year old male presenting for a physical.      Denies known Latex allergy or symptoms of Latex sensitivity.  Medications reviewed and updated.  Social History     Tobacco Use   Smoking Status Former Smoker   • Years: 5.00   Smokeless Tobacco Former User   • Types: Chew   • Quit date: 3/15/2017     Health Maintenance Due   Topic Date Due   • Depression Screening  11/20/2021       Over the last 2 weeks, how often have you been bothered by the following problems?          PHQ2 Score: 2  PHQ2 Score Interpretation: No further screening needed  1. Little interest or pleasure in activity?: 1  2. Feeling down, depressed, or hopeless?: 1           Patient is due for the topics as listed above and wishes to proceed with them.           room air

## 2023-10-08 NOTE — ED ADULT NURSE NOTE - CAS TRG GEN SKIN COLOR
Addended by: NAZ SANCHES on: 1/11/2022 02:33 PM     Modules accepted: Level of Service    
Normal for race

## 2023-10-08 NOTE — ED ADULT NURSE NOTE - CHIEF COMPLAINT QUOTE
pt bibems accompanied by JANNIE downing #[id number] after making threatening statements to wife. per EMS, he took 2 klonopin and has been drinking. pt making threatening statements to staff and being noncompliant in triage. per wife, pt was making SI/HI statements to kill his father with a chainsaw. pmhx of DM and depression. +SI/HI. . 1:1 initiated in triage.

## 2023-10-08 NOTE — ED ADULT NURSE NOTE - OBJECTIVE STATEMENT
After Visit Summary   3/9/2018    Lesli Lorenzana    MRN: [id number]           Patient Information     Date Of Birth          1944        Visit Information        Provider Department      3/9/2018 9:45 AM Tonya Thapa NP [facility]        Today's Diagnoses     Coronary artery disease involving native heart without angina pectoris, unspecified vessel or lesion type    -  1    Essential hypertension with goal blood pressure less than 140/90        Hyperlipidemia LDL goal <70        Coronary artery disease due to lipid rich plaque        Secondary hypertension with goal blood pressure less than 140/90        Coronary artery disease involving native heart with angina pectoris, unspecified vessel or lesion type (H)          Care Instructions    You were seen today in the Cardiovascular Clinic at the [facility].    Cardiology provider you saw during your visit Tonya Thapa NP.     1. Your home blood pressures are at goal, continue current medications.  2. Repeat fasting lipid panel in 2 months.  3. Please make a follow-up appointment with Dr. Lagunas in 1 year.    Questions and [phone].   First press #1 for the University and then press #3 for Medical Questions to reach the Cardiology triage nurse.     On Call Cardiologist for after hours or on weekends: [phone], press option #4 and ask to speak to the on-call Cardiologist.             Follow-ups after your visit        Additional Services     Follow-Up with Cardiologist       Fasting lipids in 2 months  Dr. Lagunas in 1 year                  Your next 10 appointments already scheduled     Mar 23, 2018  9:45 AM CDT   (Arrive by 9:30 AM)   Return Liver Transplant with Tucker Muhammad MD   [facility] ([facility] [place])    [address] [place] [address]  [address] [place] [address] [phone]            May 08, 2018  8:00 AM CDT   LAB with AN LAB   [facility]  ([facility] [place])    [address] [facility] [address] [phone]           Please do not eat 10-12 hours before your appointment if you are coming in fasting for labs on lipids, cholesterol, or glucose (sugar). This does not apply to pregnant women. Water, hot tea and black coffee (with nothing added) are okay. Do not drink other fluids, diet soda or chew gum.              Future tests that were ordered for you today     Open Future Orders        Priority Expected Expires Ordered    Follow-Up with Cardiologist Routine 3/9/2019 3/10/2019 3/9/2018    Lipid panel reflex to direct LDL Fasting Routine 5/8/2018 3/10/2019 3/9/2018            Who to contact     If you have questions or need follow up information about today's clinic visit or your schedule please contact [facility] directly at [phone].  Normal or non-critical lab and imaging results will be communicated to you by Invenrahart, letter or phone within 4 business days after the clinic has received the results. If you do not hear from us within 7 days, please contact the clinic through Pollsbt or phone. If you have a critical or abnormal lab result, we will notify you by phone as soon as possible.  Submit refill requests through HypePoints or call your pharmacy and they will forward the refill request to us. Please allow 3 business days for your refill to be completed.          Additional Information About Your Visit        MyChart Information     [employer] gives you secure access to your electronic health record. If you see a primary care provider, you can also send messages to your care team and make appointments. If you have questions, please call your primary care clinic.  If you do not have a primary care provider, please call [phone] and they will assist you.        Care EveryWhere ID     This is your Care EveryWhere ID. This could be used by other organizations to access your [facility] medical records  [id number]         Your Vitals Were     Pulse Height Pulse Oximetry BMI (Body Mass Index)          89 1.524 m (5') 98% 27.36 kg/m2         Blood Pressure from Last 3 Encounters:   03/09/18 187/89   09/15/17 116/59   07/28/17 194/80    Weight from Last 3 Encounters:   03/09/18 63.5 kg (140 lb 1.6 oz)   07/28/17 58.1 kg (128 lb)   07/17/17 57.8 kg (127 lb 6.8 oz)                 Today's Medication Changes          These changes are accurate as of 3/9/18 10:42 AM.  If you have any questions, ask your nurse or doctor.               These medicines have changed or have updated prescriptions.        Dose/Directions    lisinopril 2.5 MG tablet   Commonly known as:  PRINIVIL/Zestril   This may have changed:  See the new instructions.   Used for:  Coronary artery disease involving native heart without angina pectoris, unspecified vessel or lesion type   Changed by:  Tonya Thapa NP        Dose:  2.5 mg   Take 1 tablet (2.5 mg) by mouth daily   Quantity:  90 tablet   Refills:  3            Where to get your medicines      These medications were sent to [facility] [address], [facility] [address]  [id number] [place] [id number]     Phone:  [phone]     isosorbide mononitrate 60 MG 24 hr tablet    lisinopril 2.5 MG tablet    metoprolol tartrate 50 MG tablet                Primary Care Provider Office Phone # Fax #    Dee Awan [phone] [address] [facility] [address]        Equal Access to Services     [facility] : Hadii kasey fairchild hadasho Soharitha, waaxda luqadaha, qaybta kaalmada adealdo cole. So [facility] [phone].    ATENCIÓN: Si habla español, tiene a messina disposición servicios gratuitos de asistencia lingüística. Llame al [phone].    We comply with applicable federal civil rights laws and [place] laws. We do not discriminate on the basis of race, color, national origin, age, disability, sex, sexual  orientation, or gender identity.            Thank you!     Thank you for choosing [facility]  for your care. Our goal is always to provide you with excellent care. Hearing back from our patients is one way we can continue to improve our services. Please take a few minutes to complete the written survey that you may receive in the mail after your visit with us. Thank you!             Your Updated Medication List - Protect others around you: Learn how to safely use, store and throw away your medicines at www.disposemymeds.org.          This list is accurate as of 3/9/18 10:42 AM.  Always use your most recent med list.                   Brand Name Dispense Instructions for use Diagnosis    aspirin 81 MG tablet     90 tablet    Take 1 tablet by mouth daily.    CAD (coronary artery disease)       calcium-vitamin D 600-400 MG-UNIT per tablet    CALTRATE     Take 1 tablet by mouth daily        ferrous gluconate 324 (38 FE) MG tablet    FERGON    100 tablet    Take 1 tablet (324 mg) by mouth daily (with breakfast)    Iron deficiency anemia, unspecified iron deficiency anemia type       hydrALAZINE 25 MG tablet    APRESOLINE    90 tablet    Take 1 tablet (25 mg) by mouth 3 times daily , as needed if systolic blood pressure (top number) is more than 180 mmHg.    Essential hypertension with goal blood pressure less than 140/90       isosorbide mononitrate 60 MG 24 hr tablet    IMDUR    180 tablet    Take 1 tablet (60 mg) by mouth 2 times daily    Coronary artery disease due to lipid rich plaque, Secondary hypertension with goal blood pressure less than 140/90       lisinopril 2.5 MG tablet    PRINIVIL/Zestril    90 tablet    Take 1 tablet (2.5 mg) by mouth daily    Coronary artery disease involving native heart without angina pectoris, unspecified vessel or lesion type       metoprolol tartrate 50 MG tablet    LOPRESSOR    180 tablet    Take 1 tablet (50 mg) by mouth 2 times daily    Essential hypertension with goal  blood pressure less than 140/90, Coronary artery disease involving native heart with angina pectoris, unspecified vessel or lesion type (H)       MULTI-VITAMIN PO      Take 1 tablet by mouth daily        nystatin-triamcinolone cream    MYCOLOG II     Apply topically 4 times daily as needed Reported on 4/5/2017        * predniSONE 20 MG tablet    DELTASONE    12 tablet    Take 80 mg daily 7/7-7/13, then 60 mg daily 7/14-7/20, then 40 mg daily 7/21-7/27, then stay on 20 mg daily.    Liver transplant rejection (H)       * predniSONE 5 MG tablet    DELTASONE    90 tablet    Take 1 tablet (5 mg) by mouth daily starting 7/27    Liver transplant rejection (H), Liver transplanted (H)       tacrolimus 0.5 MG capsule    GENERIC EQUIVALENT    360 capsule    Take 2 capsules (1 mg) by mouth every 12 hours    Liver replaced by transplant (H)       * Notice:  This list has 2 medication(s) that are the same as other medications prescribed for you. Read the directions carefully, and ask your doctor or other care provider to review them with you.       43 yom BIBEMS accompanied by [employer] who state that pt was making threatening statements to wife and stating that he was going to kill his father, also states that he is in a "diabetic emergency." Pt denies pain, denies SOB, denies cp, "I have no pain but everyone here is going to have pain when I get out of here." Handcuffed to bed, +PMS bilaterally distal to handcuffs, A&Ox4, no signs trauma, BG elevated over 400 in triage. EKG completed in room.

## 2023-10-09 VITALS
OXYGEN SATURATION: 100 % | DIASTOLIC BLOOD PRESSURE: 76 MMHG | SYSTOLIC BLOOD PRESSURE: 126 MMHG | RESPIRATION RATE: 18 BRPM | HEART RATE: 81 BPM

## 2023-10-09 LAB
ACETONE SERPL-MCNC: NEGATIVE — SIGNIFICANT CHANGE UP
AMPHET UR-MCNC: NEGATIVE — SIGNIFICANT CHANGE UP
APAP SERPL-MCNC: < 2 UG/ML (ref 10–30)
APPEARANCE UR: CLEAR — SIGNIFICANT CHANGE UP
BARBITURATES UR SCN-MCNC: NEGATIVE — SIGNIFICANT CHANGE UP
BASE EXCESS BLDV CALC-SCNC: -0.7 MMOL/L — SIGNIFICANT CHANGE UP (ref -2–3)
BASOPHILS # BLD AUTO: 0.07 K/UL — SIGNIFICANT CHANGE UP (ref 0–0.2)
BASOPHILS NFR BLD AUTO: 0.9 % — SIGNIFICANT CHANGE UP (ref 0–2)
BENZODIAZ UR-MCNC: NEGATIVE — SIGNIFICANT CHANGE UP
BILIRUB UR-MCNC: NEGATIVE — SIGNIFICANT CHANGE UP
COCAINE METAB.OTHER UR-MCNC: NEGATIVE — SIGNIFICANT CHANGE UP
COLOR SPEC: YELLOW — SIGNIFICANT CHANGE UP
DIFF PNL FLD: NEGATIVE — SIGNIFICANT CHANGE UP
EOSINOPHIL # BLD AUTO: 0.27 K/UL — SIGNIFICANT CHANGE UP (ref 0–0.5)
EOSINOPHIL NFR BLD AUTO: 3.5 % — SIGNIFICANT CHANGE UP (ref 0–6)
ETHANOL SERPL-MCNC: 213 MG/DL — HIGH (ref 0–10)
GLUCOSE BLDC GLUCOMTR-MCNC: 132 MG/DL — HIGH (ref 70–99)
GLUCOSE BLDC GLUCOMTR-MCNC: 218 MG/DL — HIGH (ref 70–99)
GLUCOSE UR QL: >=1000 MG/DL
HCO3 BLDV-SCNC: 27 MMOL/L — SIGNIFICANT CHANGE UP (ref 22–29)
HCT VFR BLD CALC: 39.2 % — SIGNIFICANT CHANGE UP (ref 39–50)
HGB BLD-MCNC: 14 G/DL — SIGNIFICANT CHANGE UP (ref 13–17)
IMM GRANULOCYTES NFR BLD AUTO: 0.5 % — SIGNIFICANT CHANGE UP (ref 0–0.9)
KETONES UR-MCNC: NEGATIVE MG/DL — SIGNIFICANT CHANGE UP
LEUKOCYTE ESTERASE UR-ACNC: NEGATIVE — SIGNIFICANT CHANGE UP
LYMPHOCYTES # BLD AUTO: 2.61 K/UL — SIGNIFICANT CHANGE UP (ref 1–3.3)
LYMPHOCYTES # BLD AUTO: 34 % — SIGNIFICANT CHANGE UP (ref 13–44)
MCHC RBC-ENTMCNC: 31.3 PG — SIGNIFICANT CHANGE UP (ref 27–34)
MCHC RBC-ENTMCNC: 35.7 GM/DL — SIGNIFICANT CHANGE UP (ref 32–36)
MCV RBC AUTO: 87.5 FL — SIGNIFICANT CHANGE UP (ref 80–100)
METHADONE UR-MCNC: NEGATIVE — SIGNIFICANT CHANGE UP
MONOCYTES # BLD AUTO: 0.39 K/UL — SIGNIFICANT CHANGE UP (ref 0–0.9)
MONOCYTES NFR BLD AUTO: 5.1 % — SIGNIFICANT CHANGE UP (ref 2–14)
NEUTROPHILS # BLD AUTO: 4.29 K/UL — SIGNIFICANT CHANGE UP (ref 1.8–7.4)
NEUTROPHILS NFR BLD AUTO: 56 % — SIGNIFICANT CHANGE UP (ref 43–77)
NITRITE UR-MCNC: NEGATIVE — SIGNIFICANT CHANGE UP
OPIATES UR-MCNC: NEGATIVE — SIGNIFICANT CHANGE UP
PCO2 BLDV: 54 MMHG — SIGNIFICANT CHANGE UP (ref 42–55)
PCP SPEC-MCNC: SIGNIFICANT CHANGE UP
PCP UR-MCNC: NEGATIVE — SIGNIFICANT CHANGE UP
PH BLDV: 7.3 — LOW (ref 7.32–7.43)
PH UR: 5 — SIGNIFICANT CHANGE UP (ref 5–8)
PLATELET # BLD AUTO: 277 K/UL — SIGNIFICANT CHANGE UP (ref 150–400)
PO2 BLDV: 65 MMHG — HIGH (ref 25–45)
PROT UR-MCNC: NEGATIVE MG/DL — SIGNIFICANT CHANGE UP
RAPID RVP RESULT: SIGNIFICANT CHANGE UP
RBC # BLD: 4.48 M/UL — SIGNIFICANT CHANGE UP (ref 4.2–5.8)
RBC # FLD: 12.1 % — SIGNIFICANT CHANGE UP (ref 10.3–14.5)
SALICYLATES SERPL-MCNC: 2 MG/DL — LOW (ref 2.8–20)
SAO2 % BLDV: 91 % — HIGH (ref 67–88)
SARS-COV-2 RNA SPEC QL NAA+PROBE: SIGNIFICANT CHANGE UP
SP GR SPEC: 1.02 — SIGNIFICANT CHANGE UP (ref 1–1.03)
THC UR QL: NEGATIVE — SIGNIFICANT CHANGE UP
UROBILINOGEN FLD QL: 0.2 MG/DL — SIGNIFICANT CHANGE UP (ref 0.2–1)
WBC # BLD: 7.67 K/UL — SIGNIFICANT CHANGE UP (ref 3.8–10.5)
WBC # FLD AUTO: 7.67 K/UL — SIGNIFICANT CHANGE UP (ref 3.8–10.5)

## 2023-10-09 PROCEDURE — 90792 PSYCH DIAG EVAL W/MED SRVCS: CPT

## 2023-10-09 RX ORDER — BUPROPION HYDROCHLORIDE 150 MG/1
1 TABLET, EXTENDED RELEASE ORAL
Qty: 0 | Refills: 0 | DISCHARGE

## 2023-10-09 RX ORDER — QUETIAPINE FUMARATE 200 MG/1
1 TABLET, FILM COATED ORAL
Qty: 0 | Refills: 0 | DISCHARGE

## 2023-10-09 RX ORDER — INSULIN HUMAN 100 [IU]/ML
6 INJECTION, SOLUTION SUBCUTANEOUS ONCE
Refills: 0 | Status: COMPLETED | OUTPATIENT
Start: 2023-10-09 | End: 2023-10-09

## 2023-10-09 RX ADMIN — INSULIN HUMAN 6 UNIT(S): 100 INJECTION, SOLUTION SUBCUTANEOUS at 01:37

## 2023-10-09 NOTE — ED BEHAVIORAL HEALTH ASSESSMENT NOTE - HPI (INCLUDE ILLNESS QUALITY, SEVERITY, DURATION, TIMING, CONTEXT, MODIFYING FACTORS, ASSOCIATED SIGNS AND SYMPTOMS)
Patient a 42 y/o  male, domiciled alone,  from wife and 2 children D-5 and S-3, whom children are with, employed with [employer], hx of Mood D/O, no prior Psychiatric Hospitalization, no prior SI/SA, hx of alcohol use disorder, according to wife often passes out and has blackouts, no other drug abuse hx, medically has DM was BIB/EMS for making SI/HI statements while intoxication,  upon arrival, blood glucose 456. Psychiatry consulted for evaluation.     Patient  in bed, AAOX3, endorses that he drinking and about to take his dog into the house and does is not aware what happened to him after that, he just remembers being at ED at . He is aware that he is at . He has Psychiatric diagnosis of Mood D/O, and takes meds e.g. Klonopin 0.5 mg TID as needed with Lexapro 20 mg daily, and Lamictal 50 mg daily. He never tried to commit suicide, no drug abuse hx, significant EtOH abuse. denied A/V/H or paranoid beliefs. No SHIIP. No delusions elicited.     Medically has DM and takes Farxiga 1 tab daily. Spoke with wife Lillian Mattson @ [phone] she has concerns for safety at this time because he is not managing his DM and his blood sugars are making him delirious, thinks  has DKA, has an extensive EtOH abuse problem and thinks he is lying about not being suicidal. [place] [place] [facility] so she has not observed patient first hand. She would like patient admitted medically or psychiatrically. Explained that during assessment patient is found to be suicidal, does not want to die, does not want to hurt others, does not have a plan or intent to harm self, is future oriented and would like to return to work and get back into outpatient treatment. Informed that we will involve SW for referral to [facility] for outpatient mental health treatment. Also explained DM concerns to be deferred to medical attending.

## 2023-10-09 NOTE — PHARMACOTHERAPY INTERVENTION NOTE - COMMENTS
Med history complete, reviewed medications and allergies with patient and confirmed medication list with doctor first med profile, patient states he stopped taking escitalopram, lamotrigine, and naltrexone a week or two ago, all medication related questions answered

## 2023-10-09 NOTE — ED BEHAVIORAL HEALTH ASSESSMENT NOTE - MEDICAL RECORD REVIEWED
Bed in lowest position, wheels locked, appropriate side rails in place/Call bell, personal items and telephone in reach/Instruct patient to call for assistance before getting out of bed or chair/Non-slip footwear when patient is out of bed/[place] to call system/Physically safe environment - no spills, clutter or unnecessary equipment/Purposeful Proactive Rounding/Room/bathroom lighting operational, light cord in reach
Yes

## 2023-10-09 NOTE — ED PROVIDER NOTE - CLINICAL SUMMARY MEDICAL DECISION MAKING FREE TEXT BOX
43-year-old male with history of diabetes and depression/anxiety brought in by EMS and police for evaluation of agitation with SI and HI.  The patient also notes that he has been having difficulty controlling his glucose over the past several days so he took 2 extra units of long-acting insulin tonight.  Patient states that he also took some Klonopin approximately 2 tablets and his usual dose of Seroquel.  Patient does admit to drinking a lot of alcohol tonight.  The patient was apparently making threats towards himself and his wife.  He is currently living 3 hours away from his wife and notes that he had a visit with her over the weekend and took his Klonopin more often due to stress.   Will perform workup to r/o HHS and DKA, provide IV fluid hydration and insulin prn hyperglycemia.  Pt to be medically cleared for psychiatric evaluation.

## 2023-10-09 NOTE — ED BEHAVIORAL HEALTH ASSESSMENT NOTE - SUMMARY
Patient a 42 y/o  male, domiciled alone,  from wife and 2 children D-5 and S-3, whom children are with, employed with [employer], hx of Mood D/O, no prior Psychiatric Hospitalization, no prior SI/SA, hx of alcohol use disorder, according to wife often passes out and has blackouts, no other drug abuse hx, medically has DM was BIB/EMS for making SI/HI statements while intoxication,  upon arrival, blood glucose 456. Psychiatry consulted for evaluation.     Patient  in bed, AAOX3, endorses that he drinking and about to take his dog into the house and does is not aware what happened to him after that, he just remembers being at ED at . He is aware that he is at . He has Psychiatric diagnosis of Mood D/O, and takes meds e.g. Klonopin 0.5 mg TID as needed with Lexapro 20 mg daily, and Lamictal 50 mg daily. He never tried to commit suicide, no drug abuse hx, significant EtOH abuse. denied A/V/H or paranoid beliefs. No SHIIP. No delusions elicited. Patient is not an acute threat to self of others and does not warrant for an inpatient admission, with motivation to follow-up with outpatient referrals for mental health treatment. Patient is agreeable to discharge plan and able to engage in safety planning.     Plan: Discharge pt to home          SBIRT Consult          F/U with LOIDA

## 2023-10-09 NOTE — ED BEHAVIORAL HEALTH ASSESSMENT NOTE - PATIENT'S CHIEF COMPLAINT
I do not want to hurt myself, I'm worried now that I am going to lose my job for not showing up because I am here."

## 2023-10-09 NOTE — ED PROVIDER NOTE - PATIENT PORTAL LINK FT
You can access the FollowMyHealth Patient Portal offered by [facility] by registering at the following website: [email or web address]. By joining [facility]’s FollowMyHealth portal, you will also be able to view your health information using other applications (apps) compatible with our system.

## 2023-10-09 NOTE — ED PROVIDER NOTE - NSFOLLOWUPINSTRUCTIONS_ED_ALL_ED_FT
You were evaluated in the ER. You were seen and cleared medically and psychiatrically. Please follow up with outpatient psychiatrist. Reach out to [facility] for substance misuse assistance at [phone].     Self-Destructive Behavior  Self-destructive behavior, or dysregulated behavior, refers to behaviors that can harm the person who does them. Self-destructive behavior is often used as a way to cope with painful emotions and stress. It is often habit-forming and difficult to control without help. Certain self-destructive behaviors can result in serious injury or death.    Self-destructive behavior can be a symptom of a mental health condition and this may put you at risk for thoughts of suicide in the future. Treatment may be required.    What are the risks?  Self-destructive behavior has risks, which include:  Hurting yourself to release tension or lessen emotional pain. This includes banging your head, cutting, scratching, or burning yourself.  Eating problems, such as:  Eating very little or not eating at all (anorexia).  Eating and making yourself vomit (purging).  Eating too much in a short time period (binge eating).  Using medicine, such as laxatives and water pills, to lose weight.  Drinking too much alcohol.  Abusing drugs.  Going on shopping sprees, spending recklessly, or gambling until you go into debt.  Any type of addictive behavior, including gambling and shoplifting.  Not setting healthy limits. This includes denying yourself proper rest and food in order to meet the needs of others.  Self-directed violence and other forms of self-harming behavior. This may include suicide.  Follow these instructions at home:  A bottle of beer, a glass of wine, and a glass of hard liquor with a "do not drink" sign over them.  Avoid alcohol and drugs.  Keep all follow-up visits. This is important.  General tips and recommendations  A young person talking with a counselor.   Talk with your health care provider. He or she may refer you to a mental health counselor for talk therapy.  Work with a counselor to help you recognize the source of your problems, sort out your feelings, and find strategies to cope with stress and your feelings.  Try to distract yourself with other activities until you are calm and are able to seek help, these include exercising, keeping a journal, or speaking with a friend.  Find healthy coping strategies that work for you, such as:  Exercise.  Spending time in nature.  Journaling.  Relaxation methods, such as deep breathing or yoga.  Learn to identify and avoid the things that trigger your self-destructive behavior.  Get involved in a local support group.  Where to find more information  Learn more about self-destructive behavior by visiting these websites:  National [place] on Mental Illness: www.jerod.org  Centers for Disease Control and Prevention: www.cdc.gov  Contact a health care provider if:  You become ill or you get injured as a result of self-destructive behavior.  You are ready to seek treatment for addiction.  You need help finding a support group.  Get help right away if:  Your behavior interferes with daily activities, such as work or school.  You have thoughts of hurting yourself or others.  If you ever feel like you may hurt yourself or others, or have thoughts about taking your own life, get help right away. Go to your nearest emergency department or:  Call your local emergency services (104 in the [place]  Call a suicide crisis helpline, such as the National Suicide Prevention Lifeline at 1-256.667.7287 or 743 in the [place] This is open 24 hours a day in the [place]  Text the Crisis Text Line at 467996 (in the [place]  Summary  Self-destructive behavior includes behaviors, such as cutting, eating disorders, drug or alcohol abuse, and gambling. These may feel good in the moment, but can be harmful over time.  Self-destructive behavior is often used as a way to deal with painful emotions and stress. It is often habit-forming and difficult to control without help.  See your health care provider or counselor if you have thoughts of hurting yourself or hurting others. He or she will help you recognize the source of your problems, understand your feelings, and get the help you need.  This information is not intended to replace advice given to you by your health care provider. Make sure you discuss any questions you have with your health care provider.

## 2023-10-09 NOTE — ED BEHAVIORAL HEALTH ASSESSMENT NOTE - DESCRIPTION
Vital Signs Last 24 Hrs  T(C): 36.7 (09 Oct 2023 06:19), Max: 36.7 (09 Oct 2023 06:19)  T(F): 98 (09 Oct 2023 06:19), Max: 98 (09 Oct 2023 06:19)  HR: 81 (09 Oct 2023 10:15) (75 - 98)  BP: 126/76 (09 Oct 2023 10:15) (105/73 - 141/98)  BP(mean): --  RR: 18 (09 Oct 2023 10:15) (18 - 20)  SpO2: 100% (09 Oct 2023 10:15) (100% - 100%)    Parameters below as of 09 Oct 2023 10:15  Patient On (Oxygen Delivery Method): room air DM  male, domiciled with family; employed at [employer]

## 2023-10-09 NOTE — ED PROVIDER NOTE - PROGRESS NOTE DETAILS
Maya, DO: Patient seen and cleared by psychiatry, blood glucose improved at this time.  Will give endocrinology follow-up and work note as requested.  Discussed return precautions and all questions answered. Pt in agreement w/ plan. CAOx3, NAD, VSS. Stable for d/c. DO Francesco: Patient signed out to me by Dr. Villela.  Here with alcohol intoxication and made suicidal/homicidal statements.  Patient currently awaiting psychiatry consult for disposition.

## 2023-10-09 NOTE — CHART NOTE - NSCHARTNOTEFT_GEN_A_CORE
Pt is a 43 yr old English speaking male with a PMHx of   DM w/insulin, anxiety, depression, and  ETOH misuse. Pt presents to  ED via ambulance from home, accompanied by [facility], for making SI/HI statements. YOVANI consulted for pt's  non-compliance with  DM medication and lack od endocrine follow up.   YOVANI met with pt at bedside, introduced self and colleague, YOVANI Santizo. Consult reviewed. Pt declines SBIRT screening however receptive to brief education re: alcohol use and diabetes. Pt reports he had endocrine today with Dr. Grajeda at [facility], however will not f/u with this MD again as MD is not in pt's insurance network. Pt declines assist with finding new endocrinologist at this time.   Pt requested assist with transport home. YOVANI ordered [employer] ride. Pt walked out, Uber ride was cancelled. case discussed with MD.

## 2023-10-09 NOTE — ED ADULT NURSE REASSESSMENT NOTE - NS ED NURSE REASSESS COMMENT FT1
1:1 maintained, pt resting comfortably on stretcher, no s/sx of distress noted.  will con't to monitor

## 2023-10-09 NOTE — ED BEHAVIORAL HEALTH ASSESSMENT NOTE - NSBHATTESTAPPBILLTIME_PSY_A_CORE
I attest my time as FLO is greater than 50% of the total combined time spent on qualifying patient care activities. I have reviewed and verified the documentation.

## 2024-01-21 VITALS — HEIGHT: 70 IN | WEIGHT: 145.06 LBS

## 2024-01-21 LAB
ALBUMIN SERPL ELPH-MCNC: 3.8 G/DL — SIGNIFICANT CHANGE UP (ref 3.3–5)
ALP SERPL-CCNC: 108 U/L — SIGNIFICANT CHANGE UP (ref 40–120)
ALT FLD-CCNC: 67 U/L — SIGNIFICANT CHANGE UP (ref 12–78)
AMPHET UR-MCNC: NEGATIVE — SIGNIFICANT CHANGE UP
ANION GAP SERPL CALC-SCNC: 1 MMOL/L — LOW (ref 5–17)
APAP SERPL-MCNC: <2 UG/ML — LOW (ref 10–30)
APPEARANCE UR: CLEAR — SIGNIFICANT CHANGE UP
AST SERPL-CCNC: 23 U/L — SIGNIFICANT CHANGE UP (ref 15–37)
BARBITURATES UR SCN-MCNC: NEGATIVE — SIGNIFICANT CHANGE UP
BASOPHILS # BLD AUTO: 0.1 K/UL — SIGNIFICANT CHANGE UP (ref 0–0.2)
BASOPHILS NFR BLD AUTO: 0.9 % — SIGNIFICANT CHANGE UP (ref 0–2)
BENZODIAZ UR-MCNC: NEGATIVE — SIGNIFICANT CHANGE UP
BILIRUB SERPL-MCNC: 0.4 MG/DL — SIGNIFICANT CHANGE UP (ref 0.2–1.2)
BILIRUB UR-MCNC: NEGATIVE — SIGNIFICANT CHANGE UP
BUN SERPL-MCNC: 26 MG/DL — HIGH (ref 7–23)
CALCIUM SERPL-MCNC: 8.8 MG/DL — SIGNIFICANT CHANGE UP (ref 8.5–10.1)
CHLORIDE SERPL-SCNC: 105 MMOL/L — SIGNIFICANT CHANGE UP (ref 96–108)
CO2 SERPL-SCNC: 32 MMOL/L — HIGH (ref 22–31)
COCAINE METAB.OTHER UR-MCNC: NEGATIVE — SIGNIFICANT CHANGE UP
COLOR SPEC: YELLOW — SIGNIFICANT CHANGE UP
CREAT SERPL-MCNC: 1.03 MG/DL — SIGNIFICANT CHANGE UP (ref 0.5–1.3)
DIFF PNL FLD: NEGATIVE — SIGNIFICANT CHANGE UP
EGFR: 91 ML/MIN/1.73M2 — SIGNIFICANT CHANGE UP
EOSINOPHIL # BLD AUTO: 0.35 K/UL — SIGNIFICANT CHANGE UP (ref 0–0.5)
EOSINOPHIL NFR BLD AUTO: 3.2 % — SIGNIFICANT CHANGE UP (ref 0–6)
ETHANOL SERPL-MCNC: <10 MG/DL — SIGNIFICANT CHANGE UP (ref 0–10)
FLUAV AG NPH QL: SIGNIFICANT CHANGE UP
FLUBV AG NPH QL: SIGNIFICANT CHANGE UP
GLUCOSE SERPL-MCNC: 365 MG/DL — HIGH (ref 70–99)
GLUCOSE UR QL: >=1000 MG/DL
HCT VFR BLD CALC: 42.7 % — SIGNIFICANT CHANGE UP (ref 39–50)
HGB BLD-MCNC: 15.1 G/DL — SIGNIFICANT CHANGE UP (ref 13–17)
IMM GRANULOCYTES NFR BLD AUTO: 0.4 % — SIGNIFICANT CHANGE UP (ref 0–0.9)
KETONES UR-MCNC: NEGATIVE MG/DL — SIGNIFICANT CHANGE UP
LEUKOCYTE ESTERASE UR-ACNC: NEGATIVE — SIGNIFICANT CHANGE UP
LYMPHOCYTES # BLD AUTO: 28.5 % — SIGNIFICANT CHANGE UP (ref 13–44)
LYMPHOCYTES # BLD AUTO: 3.09 K/UL — SIGNIFICANT CHANGE UP (ref 1–3.3)
MCHC RBC-ENTMCNC: 31.1 PG — SIGNIFICANT CHANGE UP (ref 27–34)
MCHC RBC-ENTMCNC: 35.4 GM/DL — SIGNIFICANT CHANGE UP (ref 32–36)
MCV RBC AUTO: 87.9 FL — SIGNIFICANT CHANGE UP (ref 80–100)
METHADONE UR-MCNC: NEGATIVE — SIGNIFICANT CHANGE UP
MONOCYTES # BLD AUTO: 0.57 K/UL — SIGNIFICANT CHANGE UP (ref 0–0.9)
MONOCYTES NFR BLD AUTO: 5.3 % — SIGNIFICANT CHANGE UP (ref 2–14)
NEUTROPHILS # BLD AUTO: 6.69 K/UL — SIGNIFICANT CHANGE UP (ref 1.8–7.4)
NEUTROPHILS NFR BLD AUTO: 61.7 % — SIGNIFICANT CHANGE UP (ref 43–77)
NITRITE UR-MCNC: NEGATIVE — SIGNIFICANT CHANGE UP
OPIATES UR-MCNC: NEGATIVE — SIGNIFICANT CHANGE UP
PCP SPEC-MCNC: SIGNIFICANT CHANGE UP
PCP UR-MCNC: NEGATIVE — SIGNIFICANT CHANGE UP
PH UR: 7 — SIGNIFICANT CHANGE UP (ref 5–8)
PLATELET # BLD AUTO: 354 K/UL — SIGNIFICANT CHANGE UP (ref 150–400)
POTASSIUM SERPL-MCNC: 3.6 MMOL/L — SIGNIFICANT CHANGE UP (ref 3.5–5.3)
POTASSIUM SERPL-SCNC: 3.6 MMOL/L — SIGNIFICANT CHANGE UP (ref 3.5–5.3)
PROT SERPL-MCNC: 7.3 GM/DL — SIGNIFICANT CHANGE UP (ref 6–8.3)
PROT UR-MCNC: NEGATIVE MG/DL — SIGNIFICANT CHANGE UP
RBC # BLD: 4.86 M/UL — SIGNIFICANT CHANGE UP (ref 4.2–5.8)
RBC # FLD: 12.4 % — SIGNIFICANT CHANGE UP (ref 10.3–14.5)
RSV RNA NPH QL NAA+NON-PROBE: SIGNIFICANT CHANGE UP
SALICYLATES SERPL-MCNC: <1.7 MG/DL — LOW (ref 2.8–20)
SARS-COV-2 RNA SPEC QL NAA+PROBE: SIGNIFICANT CHANGE UP
SODIUM SERPL-SCNC: 138 MMOL/L — SIGNIFICANT CHANGE UP (ref 135–145)
SP GR SPEC: >1.03 — HIGH (ref 1–1.03)
THC UR QL: NEGATIVE — SIGNIFICANT CHANGE UP
TSH SERPL-MCNC: 2.31 UU/ML — SIGNIFICANT CHANGE UP (ref 0.34–4.82)
UROBILINOGEN FLD QL: 1 MG/DL — SIGNIFICANT CHANGE UP (ref 0.2–1)
WBC # BLD: 10.84 K/UL — HIGH (ref 3.8–10.5)
WBC # FLD AUTO: 10.84 K/UL — HIGH (ref 3.8–10.5)

## 2024-01-21 PROCEDURE — 99285 EMERGENCY DEPT VISIT HI MDM: CPT

## 2024-01-21 RX ORDER — INSULIN LISPRO 100/ML
6 VIAL (ML) SUBCUTANEOUS ONCE
Refills: 0 | Status: COMPLETED | OUTPATIENT
Start: 2024-01-21 | End: 2024-01-21

## 2024-01-21 RX ORDER — SODIUM CHLORIDE 9 MG/ML
1500 INJECTION INTRAMUSCULAR; INTRAVENOUS; SUBCUTANEOUS ONCE
Refills: 0 | Status: DISCONTINUED | OUTPATIENT
Start: 2024-01-21 | End: 2024-01-22

## 2024-01-21 NOTE — ED PROVIDER NOTE - PHYSICAL EXAMINATION
Gen:  Well appearing in NAD  Head:  NC/AT  HEENT: pupils perrl,no pharyngeal erythema, uvula midline  Cardiac: S1S2, RRR  Abd: Soft, non tender  Resp: No distress, CTA   musculoskeletal:: no deformities, no swelling, strength +5/+5  Skin: warm and dry as visualized, no rashes  Neuro: LACY, aao x 4  Psych:alert, cooperative, appropriate mood and affect for situation, +SI

## 2024-01-21 NOTE — ED PROVIDER NOTE - PROGRESS NOTE DETAILS
No bed on 5N - discharge cancelled Insulin regimen reviewed with pharmacy -- will order Lantus 10qHS, Admelog 3U TIDAC with low-dose corrective sliding scale.

## 2024-01-21 NOTE — ED ADULT NURSE NOTE - SUICIDE SCREENING QUESTION 2
Patient did answer \"yes\" to the suicide screening question of \"Do you ever wish you were dead or would not wake up from sleep? \" He denies any current suicidal ideation or ever having any. He stated that it is just because of him having the trach and the PEG tube. RN asked the patient if he wanted to talk about it and the patient laughed and said no but he would call me if he did want to talk. RN notified Dr. Urban Lombard. Yes

## 2024-01-21 NOTE — ED ADULT TRIAGE NOTE - NS_BH TRG QUESTION7_ED_ALL_ED
Depression (without Suicidality or Psychosis)/Joy (includes Bipolar Disorder)/Anxiety (includes Panic, OCD)

## 2024-01-21 NOTE — ED PROVIDER NOTE - ATTENDING APP SHARED VISIT CONTRIBUTION OF CARE
Dr. Hanley: I performed a face to face bedside interview with patient regarding history of present illness, review of symptoms and past medical history. I completed an independent physical exam.  I have discussed patient's plan of care with PA.   I agree with note as stated above, having amended the EMR as needed to reflect my findings.   This includes HISTORY OF PRESENT ILLNESS, HIV, PAST MEDICAL/SURGICAL/FAMILY/SOCIAL HISTORY, ALLERGIES AND HOME MEDICATIONS, REVIEW OF SYSTEMS, PHYSICAL EXAM, and any PROGRESS NOTES during the time I functioned as the attending physician for this patient.  MILAN Hanley DO
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
## 2024-01-21 NOTE — ED PROVIDER NOTE - OBJECTIVE STATEMENT
42 y/o male with PMHx of diabetes, depression/anxiety, EtOH use disorder; presents to the ED c/o SI, expressed plan by "taking a quick turn" while driving. Patients reports non-compliance with psych meds x2 weeks, last telehealth visit x6 weeks ago. Usually takes Klonopin and Seroquel. Denies AH/VH, HI.

## 2024-01-22 ENCOUNTER — INPATIENT (INPATIENT)
Facility: HOSPITAL | Age: 44
LOS: 10 days | Discharge: HOME | DRG: 885 | End: 2024-02-02
Attending: PSYCHIATRY & NEUROLOGY | Admitting: PSYCHIATRY & NEUROLOGY
Payer: COMMERCIAL

## 2024-01-22 DIAGNOSIS — F33.1 MAJOR DEPRESSIVE DISORDER, RECURRENT, MODERATE: ICD-10-CM

## 2024-01-22 DIAGNOSIS — Z90.89 ACQUIRED ABSENCE OF OTHER ORGANS: Chronic | ICD-10-CM

## 2024-01-22 DIAGNOSIS — R45.851 SUICIDAL IDEATIONS: ICD-10-CM

## 2024-01-22 LAB
GLUCOSE BLDC GLUCOMTR-MCNC: 246 MG/DL — HIGH (ref 70–99)
GLUCOSE BLDC GLUCOMTR-MCNC: 285 MG/DL — HIGH (ref 70–99)
GLUCOSE BLDC GLUCOMTR-MCNC: 312 MG/DL — HIGH (ref 70–99)
GLUCOSE BLDC GLUCOMTR-MCNC: 77 MG/DL — SIGNIFICANT CHANGE UP (ref 70–99)

## 2024-01-22 PROCEDURE — 80061 LIPID PANEL: CPT

## 2024-01-22 PROCEDURE — 36415 COLL VENOUS BLD VENIPUNCTURE: CPT

## 2024-01-22 PROCEDURE — 83036 HEMOGLOBIN GLYCOSYLATED A1C: CPT

## 2024-01-22 PROCEDURE — 84443 ASSAY THYROID STIM HORMONE: CPT

## 2024-01-22 PROCEDURE — 82962 GLUCOSE BLOOD TEST: CPT

## 2024-01-22 RX ORDER — DEXTROSE 50 % IN WATER 50 %
12.5 SYRINGE (ML) INTRAVENOUS ONCE
Refills: 0 | Status: DISCONTINUED | OUTPATIENT
Start: 2024-01-22 | End: 2024-01-26

## 2024-01-22 RX ORDER — DEXTROSE 50 % IN WATER 50 %
25 SYRINGE (ML) INTRAVENOUS ONCE
Refills: 0 | Status: DISCONTINUED | OUTPATIENT
Start: 2024-01-22 | End: 2024-01-26

## 2024-01-22 RX ORDER — SODIUM CHLORIDE 9 MG/ML
1000 INJECTION, SOLUTION INTRAVENOUS
Refills: 0 | Status: DISCONTINUED | OUTPATIENT
Start: 2024-01-22 | End: 2024-01-27

## 2024-01-22 RX ORDER — INSULIN GLARGINE 100 [IU]/ML
12 INJECTION, SOLUTION SUBCUTANEOUS AT BEDTIME
Refills: 0 | Status: DISCONTINUED | OUTPATIENT
Start: 2024-01-22 | End: 2024-01-26

## 2024-01-22 RX ORDER — DEXTROSE 50 % IN WATER 50 %
15 SYRINGE (ML) INTRAVENOUS ONCE
Refills: 0 | Status: DISCONTINUED | OUTPATIENT
Start: 2024-01-22 | End: 2024-01-26

## 2024-01-22 RX ORDER — GLUCAGON INJECTION, SOLUTION 0.5 MG/.1ML
1 INJECTION, SOLUTION SUBCUTANEOUS ONCE
Refills: 0 | Status: DISCONTINUED | OUTPATIENT
Start: 2024-01-22 | End: 2024-01-26

## 2024-01-22 RX ORDER — INSULIN LISPRO 100/ML
VIAL (ML) SUBCUTANEOUS
Refills: 0 | Status: DISCONTINUED | OUTPATIENT
Start: 2024-01-22 | End: 2024-01-26

## 2024-01-22 RX ORDER — INSULIN LISPRO 100/ML
4 VIAL (ML) SUBCUTANEOUS
Refills: 0 | Status: DISCONTINUED | OUTPATIENT
Start: 2024-01-22 | End: 2024-01-25

## 2024-01-22 RX ORDER — ESCITALOPRAM OXALATE 10 MG/1
10 TABLET, FILM COATED ORAL DAILY
Refills: 0 | Status: DISCONTINUED | OUTPATIENT
Start: 2024-01-22 | End: 2024-01-23

## 2024-01-22 RX ORDER — CLONAZEPAM 1 MG
0.5 TABLET ORAL THREE TIMES A DAY
Refills: 0 | Status: DISCONTINUED | OUTPATIENT
Start: 2024-01-22 | End: 2024-01-23

## 2024-01-22 RX ADMIN — Medication 6 UNIT(S): at 00:21

## 2024-01-22 RX ADMIN — INSULIN GLARGINE 10 UNIT(S): 100 INJECTION, SOLUTION SUBCUTANEOUS at 22:47

## 2024-01-22 RX ADMIN — ESCITALOPRAM OXALATE 10 MILLIGRAM(S): 10 TABLET, FILM COATED ORAL at 11:20

## 2024-01-22 RX ADMIN — Medication 3 UNIT(S): at 19:01

## 2024-01-22 RX ADMIN — Medication 3: at 19:00

## 2024-01-22 NOTE — ED BEHAVIORAL HEALTH ASSESSMENT NOTE - CURRENT MEDICATION
Reports taking 4 meds, including Klonopin and lexapro, but can't recall others, and reports not taking these meds for the last 2 weeks

## 2024-01-22 NOTE — ED BEHAVIORAL HEALTH ASSESSMENT NOTE - NSPRESENTSXS_PSY_ALL_CORE
Depressed mood/Anhedonia/Impulsivity/Hopelessness or despair/Global insomnia/Refusal or inability to complete safety plan

## 2024-01-22 NOTE — ED ADULT NURSE REASSESSMENT NOTE - NS ED NURSE REASSESS COMMENT FT1
Pt lying in bed in hallway with no complaints at this time. Pt to be admitted to 5N when bed is available. Pt aware that no bed available at this time. Pt has expressed that he does not want to be transferred to a different facility. NP Nury aware. Pt remains on 1:1 constant observation. Comfort and safety maintained.

## 2024-01-22 NOTE — ED BEHAVIORAL HEALTH ASSESSMENT NOTE - DESCRIPTION
See  note    [employer] checked(Reference #: [id number]):      PDI	My Rx	Current Rx	Drug Type	Rx Written	Rx Dispensed	Drug	Quantity	Days Supply	Prescriber Name	Prescriber CHEYENNE #	Payment Method	Dispenser  A	N	N	B	11/15/2023	11/15/2023	clonazepam 0.5 mg tablet	30	30	Ashley Jurado	[id number]	Insurance	[id number]  A	N	N	B	10/16/2023	10/17/2023	clonazepam 0.5 mg tablet	30	30	Ashley Jurado	[id number]	Insurance	[id number]  A	N	N	B	08/01/2023	08/01/2023	clonazepam 0.5 mg tablet	90	30	Nathalie Newman [id number]	Insurance	[id number]  A	N	N	B	06/24/2023	06/25/2023	clonazepam 0.5 mg tablet	90	30	Nathalie Newman [id number]	Insurance	[id number]  A	N	N	B	05/19/2023	05/19/2023	clonazepam 0.5 mg tablet	90	30	Nathalie Newman [id number]	Insurance	[id number]  A	N	N	B	03/25/2023	04/03/2023	clonazepam 0.5 mg tablet	90	30	Nathalie Newman [id number]	Insurance	[id number]  A	N	N	B	02/18/2023	02/26/2023	clonazepam 0.5 mg tablet	90	30	Nathalie Newman [id number]	Insurance	[id number]  A	N	N	B	01/27/2023	02/08/2023	clonazepam 0.5 mg tablet	42	14	Abram Contreras A	[id number]	Insurance	[id number] DM See hpi
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
## 2024-01-22 NOTE — ED ADULT NURSE REASSESSMENT NOTE - NS ED NURSE REASSESS COMMENT FT1
Pt resting comfortably in bed. No complaints at present time. Pt ate all of his dinner. Plan is for patient to be voluntarily admitted although there is no bed available at this time. Constant observation remains in effect. Comfort and safety maintained.

## 2024-01-22 NOTE — ED BEHAVIORAL HEALTH ASSESSMENT NOTE - PSYCHIATRIC ISSUES AND PLAN (INCLUDE STANDING AND PRN MEDICATION)
Will defer decision on standing meds to inpatient team. For agitation, can offer PO Haldol 5 mg/Ativan 2 mg Q6H PRN. If refusing PO and is in acute risk of harm, can escalate to IM. Hold antipsychotics if QTC>500

## 2024-01-22 NOTE — ED BEHAVIORAL HEALTH ASSESSMENT NOTE - OTHER
Pt is willing to sign in on a voluntary legal status. If he retracts, he meets criteria for involuntary admission TBD

## 2024-01-22 NOTE — ED BEHAVIORAL HEALTH NOTE - BEHAVIORAL HEALTH NOTE
==================  PRE-HOSPITAL COURSE  ===================  SOURCE:  KURT Blackwell and secondhand ED documentation  DETAILS: BIB self for SI  =========  ED COURSE  =========  SOURCE:  KURT Blackwell and secondhand ED documentation.  ARRIVAL:  Per chart and RN, patient arrived via walk in. Per RN, patient was calm upon arrival, and cooperative with triage process.  BELONGINGS:  Per RN, patient arrived with belongings. All belongings were provided to hospital security, and patient currently in a gown with a 1:1 staff member.  BEHAVIOR: RN described patient to be resting, otherwise calm and cooperative, presenting with linear thought process, AAOx3, presenting with depressive mood and flat affect, remains in behavioral and impulse control, is not violent/aggressive. RN stated that the patient is endorsing SI. RN stated that there are no visible marks, bruises, or lacerations on the body. RN stated that the patient appears to be well-groomed, maintains good hygiene.  TREATMENT:  Per chart and RN, patient PRN medications.   VISITORS:  Per RN, no visitors at bedside.         COVID Exposure Screen- collateral (i.e. third-party, chart review, belongings, etc; include EMS and ED staff)   ---------------------------------------------------  1. Has the patient had a COVID-19 test in the last 90 days? Unknown.   2. Has the patient tested positive for COVID-19 antibodies? Unknown.   3. Has the patient received 2 doses of the COVID-19 vaccine? Unknown  4. In the past 10 days, has the patient been around anyone with a positive COVID-19 test?* Unknown.   5. Has the patient been out of [place] within the past 10 days? Unknown
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
## 2024-01-22 NOTE — ED BEHAVIORAL HEALTH NOTE - BEHAVIORAL HEALTH NOTE
No psychiatric open beds in the community.  Patient will need to remain in the ED for a bed to become available.

## 2024-01-23 LAB
A1C WITH ESTIMATED AVERAGE GLUCOSE RESULT: 10.7 % — HIGH (ref 4–5.6)
A1C WITH ESTIMATED AVERAGE GLUCOSE RESULT: 11 % — HIGH (ref 4–5.6)
CHOLEST SERPL-MCNC: 169 MG/DL — SIGNIFICANT CHANGE UP
ESTIMATED AVERAGE GLUCOSE: 260 MG/DL — HIGH (ref 68–114)
ESTIMATED AVERAGE GLUCOSE: 269 MG/DL — HIGH (ref 68–114)
GLUCOSE BLDC GLUCOMTR-MCNC: 172 MG/DL — HIGH (ref 70–99)
GLUCOSE BLDC GLUCOMTR-MCNC: 216 MG/DL — HIGH (ref 70–99)
GLUCOSE BLDC GLUCOMTR-MCNC: 219 MG/DL — HIGH (ref 70–99)
GLUCOSE BLDC GLUCOMTR-MCNC: 280 MG/DL — HIGH (ref 70–99)
GLUCOSE BLDC GLUCOMTR-MCNC: 306 MG/DL — HIGH (ref 70–99)
GLUCOSE BLDC GLUCOMTR-MCNC: 324 MG/DL — HIGH (ref 70–99)
HDLC SERPL-MCNC: 71 MG/DL — SIGNIFICANT CHANGE UP
LIPID PNL WITH DIRECT LDL SERPL: 85 MG/DL — SIGNIFICANT CHANGE UP
NON HDL CHOLESTEROL: 99 MG/DL — SIGNIFICANT CHANGE UP
TRIGL SERPL-MCNC: 69 MG/DL — SIGNIFICANT CHANGE UP
TSH SERPL-MCNC: 0.65 UU/ML — SIGNIFICANT CHANGE UP (ref 0.34–4.82)

## 2024-01-23 PROCEDURE — 99222 1ST HOSP IP/OBS MODERATE 55: CPT

## 2024-01-23 RX ORDER — DIPHENHYDRAMINE HCL 50 MG
50 CAPSULE ORAL EVERY 6 HOURS
Refills: 0 | Status: DISCONTINUED | OUTPATIENT
Start: 2024-01-23 | End: 2024-02-02

## 2024-01-23 RX ORDER — HALOPERIDOL DECANOATE 100 MG/ML
5 INJECTION INTRAMUSCULAR EVERY 6 HOURS
Refills: 0 | Status: DISCONTINUED | OUTPATIENT
Start: 2024-01-23 | End: 2024-02-02

## 2024-01-23 RX ORDER — DIPHENHYDRAMINE HCL 50 MG
50 CAPSULE ORAL ONCE
Refills: 0 | Status: COMPLETED | OUTPATIENT
Start: 2024-01-23 | End: 2024-01-27

## 2024-01-23 RX ORDER — HALOPERIDOL DECANOATE 100 MG/ML
5 INJECTION INTRAMUSCULAR ONCE
Refills: 0 | Status: DISCONTINUED | OUTPATIENT
Start: 2024-01-23 | End: 2024-02-02

## 2024-01-23 RX ORDER — BUPROPION HYDROCHLORIDE 150 MG/1
150 TABLET, EXTENDED RELEASE ORAL DAILY
Refills: 0 | Status: DISCONTINUED | OUTPATIENT
Start: 2024-01-24 | End: 2024-01-26

## 2024-01-23 RX ORDER — DIPHENHYDRAMINE HCL 50 MG
50 CAPSULE ORAL ONCE
Refills: 0 | Status: DISCONTINUED | OUTPATIENT
Start: 2024-01-23 | End: 2024-02-02

## 2024-01-23 RX ADMIN — Medication 3: at 09:15

## 2024-01-23 RX ADMIN — ESCITALOPRAM OXALATE 10 MILLIGRAM(S): 10 TABLET, FILM COATED ORAL at 09:26

## 2024-01-23 RX ADMIN — INSULIN GLARGINE 12 UNIT(S): 100 INJECTION, SOLUTION SUBCUTANEOUS at 21:49

## 2024-01-23 RX ADMIN — Medication 2: at 18:09

## 2024-01-23 RX ADMIN — Medication 3 UNIT(S): at 08:32

## 2024-01-23 RX ADMIN — Medication 3 UNIT(S): at 18:09

## 2024-01-23 RX ADMIN — Medication 3 UNIT(S): at 13:03

## 2024-01-23 RX ADMIN — Medication 1: at 13:03

## 2024-01-23 RX ADMIN — Medication 0.5 MILLIGRAM(S): at 09:34

## 2024-01-23 NOTE — ED BEHAVIORAL HEALTH PROGRESS NOTE - DANGER TO SELF; MENTAL ILLNESS EXPECTED TO RESPOND TO INPATIENT CARE
Suicidal behavior/Suicidal ideation with plan/means
Suicidal behavior/Suicidal ideation with plan/means

## 2024-01-23 NOTE — ED BEHAVIORAL HEALTH PROGRESS NOTE - PSYCHIATRIC ISSUES AND PLAN (INCLUDE STANDING AND PRN MEDICATION)
Restart Lexapro 10 mg, PRN Clonazepam 0.5 mg TID for anxiety. For agitation, can offer PO Haldol 5 mg/Ativan 2 mg Q6H PRN. If refusing PO and is in acute risk of harm, can escalate to IM. Hold antipsychotics if QTC>500
Restart Lexapro 10 mg, PRN Clonazepam 0.5 mg TID for anxiety. For agitation, can offer PO Haldol 5 mg/Ativan 2 mg Q6H PRN. If refusing PO and is in acute risk of harm, can escalate to IM. Hold antipsychotics if QTC>500

## 2024-01-23 NOTE — BH PATIENT PROFILE - HOME MEDICATIONS
Fiasp FlexTouch 100 units/mL injectable solution , Inject subcutaneously as directed as per sliding scale ***Fir states 20 units with meals - filled on 6/29-pt ran out awhile ago***  Tojoseegian SoloStar 300 units/mL subcutaneous solution , 14 unit(s) subcutaneously once a day (in the morning) ***PER DRFIRST- pt said he has been using an unknown amount in the morning and will occasionally use more insulin later on in the evening***  naltrexone 50 mg oral tablet , 1 tab(s) orally once a day ***pt ran out a few months ago***  lamoTRIgine 200 mg oral tablet , 1 tab(s) orally once a day ***pt ran out recently***  escitalopram 20 mg oral tablet , 1 tab(s) orally once a day ***pt ran out recently***  clonazePAM 0.5 mg oral tablet , 1 tab(s) orally 3 times a day as needed for  anxiety

## 2024-01-23 NOTE — ED BEHAVIORAL HEALTH PROGRESS NOTE - NSBHPSYCHOLCOGORIENT_PSY_A_CORE
Oriented to time, place, person, situation
Immediate family member
Oriented to time, place, person, situation

## 2024-01-23 NOTE — BH PATIENT PROFILE - FALL HARM RISK - UNIVERSAL INTERVENTIONS
Bed in lowest position, wheels locked, appropriate side rails in place/Call bell, personal items and telephone in reach/Instruct patient to call for assistance before getting out of bed or chair/Non-slip footwear when patient is out of bed/[place] to call system/Physically safe environment - no spills, clutter or unnecessary equipment/Purposeful Proactive Rounding/Room/bathroom lighting operational, light cord in reach

## 2024-01-23 NOTE — ED ADULT NURSE REASSESSMENT NOTE - NS ED NURSE REASSESS COMMENT FT1
Handoff received from KURT Epps. Pt sleeping in stretcher in 2 with 1:1 at bedside. No acute distress noted at this time. Comfort and safety measures maintained.
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
## 2024-01-23 NOTE — H&P ADULT - NS ATTEND AMEND GEN_ALL_CORE FT
43 year old male w hx alcohol use (denied use x several months), DM II, depression/anxiety/mood disorder who presented to ED 01-21 c/o SI expressed plan by "taking a quick turn" while driving. Patients reports non-compliance with psych meds x2 weeks, last telehealth visit x6 weeks ago. Usually takes Klonopin and Seroquel. Denies AH/VH, HI.  Had ex[plosive behaviour as indicated in HPI above  Admitted to [facility] for depression/SI    hx, exam , ROS and data as edited and outlined above      #Suicidal ideation  #Noncompliance w med s for psych  #Depression and anxiety  #Mood disorder  risk to self and others  UDS neg  1. management as per psych      #DM II  poor control w Hb A1c 11 despoite outpt endocrinologist  1. diet  2. lantus  3. BGM  4. ISS      #VTE  encourage ambulation        we will follow w you      #55 minutes 43 year old male w hx alcohol use (denied use x several months), DM II, depression/anxiety/mood disorder who presented to ED 01-21 c/o SI expressed plan by "taking a quick turn" while driving. Patients reports non-compliance with psych meds x2 weeks, last telehealth visit x6 weeks ago. Usually takes Klonopin and Seroquel. Denies AH/VH, HI.  Had ex[plosive behaviour as indicated in HPI above  Admitted to [facility] for depression/SI    hx, exam , ROS and data as edited and outlined above      #Suicidal ideation  #Noncompliance w med s for psych  #Depression and anxiety  #Mood disorder/ aggressive behaviour  risk to self and others  UDS neg  1. management as per psych      #DM II  poor control w Hb A1c 11 despoite outpt endocrinologist  1. diet  2. lantus  3. BGM  4. ISS      #VTE  encourage ambulation        we will follow w you      #80 minutes

## 2024-01-23 NOTE — ED ADULT NURSE REASSESSMENT NOTE - NS ED NURSE REASSESS COMMENT FT1
Rain from [facility] called for pt update. POC continues. Pt sleeping in stretcher in BH2 with 1:1 at bedside. Comfort and safety measures maintained.

## 2024-01-23 NOTE — H&P ADULT - ASSESSMENT
43 year old male with a past medical history of DM, mood disorder, aggression depression, anxiety and alcohol use disorder(denies active use), who presented to the hospital for worsening SI. Patient states that he has been increasingly depressed for the past 1 month and SI over the past few days.    Plan:  1. SI   - Management as per psychiatry     2. Mood Disorder, Depression/Anxiety  - Management as per psychiatry    3. Diabetes Type 1  - Elevated A1C: 11.0  - Long acting insulin: Lantus 12 units   - Short acting insulin on Sliding scale and standing orders  - Recommend close followup with outpatient endocrinologist    4. DVT PPX  - Encourage ambulation

## 2024-01-23 NOTE — H&P ADULT - NSHPPHYSICALEXAM_GEN_ALL_CORE
Vital Signs Last 24 Hrs  T(C): 36.6 (01-23-24 @ 13:55), Max: 36.8 (01-23-24 @ 08:09)  T(F): 97.9 (01-23-24 @ 13:55), Max: 98.3 (01-23-24 @ 08:09)  HR: 77 (01-23-24 @ 12:11) (70 - 77)  BP: 114/71 (01-23-24 @ 12:11) (114/71 - 137/67)  BP(mean): 83 (01-23-24 @ 12:11) (82 - 94)  RR: 18 (01-23-24 @ 12:11) (16 - 18)  SpO2: 99% (01-23-24 @ 12:11) (95% - 99%)    Orthostatic VS  01-23-24 @ 13:55  Lying BP: --/-- HR: --  Sitting BP: 124/78 HR: 85  Standing BP: 115/77 HR: 94  Site: --  Mode: --
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
## 2024-01-23 NOTE — ED BEHAVIORAL HEALTH PROGRESS NOTE - RISK ASSESSMENT
See risk factors above  Protective factors: No known suicide attempts, help-seeking behavior
See risk factors above  Protective factors: No known suicide attempts, help-seeking behavior

## 2024-01-23 NOTE — ED ADULT NURSE REASSESSMENT NOTE - NS ED NURSE REASSESS COMMENT FT1
Pt is calm and was informed he will be transferred to . 1 y/o male with fever, URI symptoms and wheezing. Will start Albuterol nebs and reevaluate.

## 2024-01-23 NOTE — H&P ADULT - NEGATIVE PSYCHIATRIC SYMPTOMS
no insomnia/no memory loss/no paranoia/no visual hallucinations/no auditory hallucinations/no hyperactivity no

## 2024-01-23 NOTE — ED ADULT NURSE REASSESSMENT NOTE - NS ED NURSE REASSESS COMMENT FT1
pt calm at bedside, no signs of agitation or distress. Name and  is correct on waistband. No dangerous objects in room. One to one sheet with observer on duty for pt. Awaiting consult by psych. pt calm at bedside, no signs of agitation or distress. Name and  is correct on waistband. No dangerous objects in room. One to one sheet signed by myself with one to one constant observation and Alysia at bedside. Awaiting consult by psych. Pt received by Bryce.

## 2024-01-23 NOTE — ED BEHAVIORAL HEALTH PROGRESS NOTE - NSBHATTESTAPPBILLTIME_PSY_A_CORE
Pt states it feels as though a suture may still be in his back.  Pt had excision on right upper back on 7/16/19.    Pt has no complaints of pain or inflammation, however there is slight redness around site.    Pt advised to schedule follow-up appointment with nurse.     Pt scheduled for today at 5:00 PM.   
Pt would like to speak with Dr Bentley regarding his back sutures that were removed from his back procedure with Dr Bentley. He says that his fiance' thinks that here may still be sutures in his back.   
I attest my time as FLO is greater than 50% of the total combined time spent on qualifying patient care activities. I have reviewed and verified the documentation.
I attest my time as FLO is greater than 50% of the total combined time spent on qualifying patient care activities. I have reviewed and verified the documentation.

## 2024-01-24 DIAGNOSIS — F98.8 OTHER SPECIFIED BEHAVIORAL AND EMOTIONAL DISORDERS WITH ONSET USUALLY OCCURRING IN CHILDHOOD AND ADOLESCENCE: ICD-10-CM

## 2024-01-24 LAB
GLUCOSE BLDC GLUCOMTR-MCNC: 148 MG/DL — HIGH (ref 70–99)
GLUCOSE BLDC GLUCOMTR-MCNC: 171 MG/DL — HIGH (ref 70–99)
GLUCOSE BLDC GLUCOMTR-MCNC: 228 MG/DL — HIGH (ref 70–99)
GLUCOSE BLDC GLUCOMTR-MCNC: 234 MG/DL — HIGH (ref 70–99)

## 2024-01-24 PROCEDURE — 99233 SBSQ HOSP IP/OBS HIGH 50: CPT

## 2024-01-24 PROCEDURE — 99223 1ST HOSP IP/OBS HIGH 75: CPT

## 2024-01-24 RX ADMIN — BUPROPION HYDROCHLORIDE 150 MILLIGRAM(S): 150 TABLET, EXTENDED RELEASE ORAL at 09:37

## 2024-01-24 RX ADMIN — INSULIN GLARGINE 12 UNIT(S): 100 INJECTION, SOLUTION SUBCUTANEOUS at 21:32

## 2024-01-24 RX ADMIN — Medication 3 UNIT(S): at 11:58

## 2024-01-24 RX ADMIN — Medication 3 UNIT(S): at 07:59

## 2024-01-24 RX ADMIN — Medication 4 UNIT(S): at 18:13

## 2024-01-24 RX ADMIN — Medication 2: at 11:59

## 2024-01-24 RX ADMIN — Medication 2: at 18:12
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
## 2024-01-24 NOTE — BH SOCIAL WORK INITIAL PSYCHOSOCIAL EVALUATION - NSBHCHILDAGEFT_PSY_ALL_CORE
2 young children Cartilage Graft Text: The defect edges were debeveled with a #15 scalpel blade.  Given the location of the defect, shape of the defect, the fact the defect involved a full thickness cartilage defect a cartilage graft was deemed most appropriate.  An appropriate donor site was identified, cleansed, and anesthetized. The cartilage graft was then harvested and transferred to the recipient site, oriented appropriately and then sutured into place.  The secondary defect was then repaired using a primary closure.

## 2024-01-24 NOTE — BH INPATIENT PSYCHIATRY ASSESSMENT NOTE - CURRENT MEDICATION
MEDICATIONS  (STANDING):  buPROPion XL (24-Hour) 150 milliGRAM(s) Oral daily  dextrose 5%. 1000 milliLiter(s) (100 mL/Hr) IV Continuous <Continuous>  dextrose 5%. 1000 milliLiter(s) (50 mL/Hr) IV Continuous <Continuous>  dextrose 50% Injectable 12.5 Gram(s) IV Push once  dextrose 50% Injectable 25 Gram(s) IV Push once  dextrose 50% Injectable 25 Gram(s) IV Push once  glucagon  Injectable 1 milliGRAM(s) IntraMuscular once  insulin glargine Injectable (LANTUS) 12 Unit(s) SubCutaneous at bedtime  insulin lispro (ADMELOG) corrective regimen sliding scale   SubCutaneous three times a day before meals  insulin lispro Injectable (ADMELOG) 4 Unit(s) SubCutaneous three times a day before meals    MEDICATIONS  (PRN):  dextrose Oral Gel 15 Gram(s) Oral once PRN Blood Glucose LESS THAN 70 milliGRAM(s)/deciliter  diphenhydrAMINE 50 milliGRAM(s) Oral every 6 hours PRN Extrapyramidal prophylaxis  diphenhydrAMINE 50 milliGRAM(s) Oral once PRN Extrapyramidal prophylaxis  diphenhydrAMINE Injectable 50 milliGRAM(s) IntraMuscular Once PRN Extrapyramidal prophylaxis  haloperidol     Tablet 5 milliGRAM(s) Oral every 6 hours PRN moderate psychotic agitation  haloperidol    Injectable 5 milliGRAM(s) IntraMuscular Once PRN severe psychotic agitation

## 2024-01-24 NOTE — PROGRESS NOTE ADULT - ASSESSMENT
Plan:  1. SI   - Management as per psychiatry     2. Mood Disorder, Depression/Anxiety  - Management as per psychiatry    3. Diabetes Type 1  - Elevated A1C: 11.0  - Long acting insulin: Lantus 12 units   -add pre meals insuilne  -ct to monitor and adjust insuline    4. DVT PPX  - Encourage ambulation

## 2024-01-24 NOTE — BH INPATIENT PSYCHIATRY ASSESSMENT NOTE - NSBHTIMEACTIVITIESPERFORMED_PSY_A_CORE
1. interviewed the pt to assess current mental status  2. reviewed medications  continue with wellbutrin  3. reviewed lab results   4. conferred with nursing concerning pt behavior on the unit   5. conferred with social work as CPS involved

## 2024-01-24 NOTE — BH INPATIENT PSYCHIATRY ASSESSMENT NOTE - NSBHMETABOLIC_PSY_ALL_CORE_FT
BMI: BMI (kg/m2): 21.1 (01-23-24 @ 13:55)  HbA1c: A1C with Estimated Average Glucose Result: 11.0 % (01-23-24 @ 10:23)    Glucose: POCT Blood Glucose.: 234 mg/dL (01-24-24 @ 11:57)    BP: 114/71 (01-23-24 @ 12:11) (109/64 - 148/88)Vital Signs Last 24 Hrs  T(C): 36.4 (01-24-24 @ 07:49), Max: 36.4 (01-24-24 @ 07:49)  T(F): 97.6 (01-24-24 @ 07:49), Max: 97.6 (01-24-24 @ 07:49)  HR: --  BP: --  BP(mean): --  RR: 18 (01-24-24 @ 07:49) (18 - 18)  SpO2: 100% (01-24-24 @ 07:49) (100% - 100%)    Orthostatic VS  01-24-24 @ 07:49  Lying BP: --/-- HR: --  Sitting BP: 128/74 HR: 69  Standing BP: 114/86 HR: 81  Site: upper right arm  Mode: electronic  Orthostatic VS  01-23-24 @ 13:55  Lying BP: --/-- HR: --  Sitting BP: 124/78 HR: 85  Standing BP: 115/77 HR: 94  Site: --  Mode: --    Lipid Panel: Date/Time: 01-23-24 @ 10:23  Cholesterol, Serum: 169  LDL Cholesterol Calculated: 85  HDL Cholesterol, Serum: 71  Total Cholesterol/HDL Ration Measurement: --  Triglycerides, Serum: 69

## 2024-01-24 NOTE — CHART NOTE - NSCHARTNOTEFT_GEN_A_CORE
After receiving voicemail from CPS worker Nelson, SW called her back and left a message for a return call. SW discussed case with psychiatrist. Per conversation with psychiatrist, she feels pt is able to be interviewed by CPS at this time. SW spoke with pt an informed him that CPS is looking to interview him at the hospital. Pt with some concerns and mixed feelings. SW provided pt with support. Pt receptive to speaking with CPS. SW discussed that pt would need to sign consent in order for SW to speak with CPS. Pt provided insight into current CPS involvement. SW provided pt with CPS workers contact number as requested by CPS worker. Pt receptive to reaching out to worker. Per conversation with pt, SW will follow up with pt tomorrow to obtain consent. Pt provided SW with verbal consent to speak with his wife. SW left another message for CPS worker for a return call. SW to continue to follow.
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
## 2024-01-24 NOTE — BH SAFETY PLAN - ENVIRONMENT SAFETY 2:
I will attend AA meetings on a regular basis and get a sponsor to manage my alcohol misuse.  I will attend AA meetings on a regular basis and get a sponsor to maintain my sobriety.

## 2024-01-24 NOTE — BH SOCIAL WORK INITIAL PSYCHOSOCIAL EVALUATION - NSBHSAALC_PSY_A_CORE FT
Patient reports past history of alcohol misuse, drinking to the point of blackout, but denies any current alcohol use at all. UTOX negative, BAL <10
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
## 2024-01-24 NOTE — BH INPATIENT PSYCHIATRY ASSESSMENT NOTE - NSTXDCOTHRGOAL_PSY_ALL_CORE
Patient will have an appropriate plan for discharge to include safe placement upon d/c and an appointment for continuing in the appropriate level of psychiatric outpatient treatment within 5 business days of d/c from . Pt will have an evaluation of alcohol and substance abuse treatment and will have an appoiintment in place to address those issues as well if indicated.

## 2024-01-24 NOTE — BH SOCIAL WORK INITIAL PSYCHOSOCIAL EVALUATION - NSPASTPSYCHHX_PSY_ALL_CORE
Price (Do Not Change): 0.00
Detail Level: Simple
Instructions: This plan will send the code FBSE to the PM system.  DO NOT or CHANGE the price.
Yes
DISPLAY PLAN FREE TEXT

## 2024-01-24 NOTE — BH INPATIENT PSYCHIATRY ASSESSMENT NOTE - NSBHASSESSSUMMFT_PSY_ALL_CORE
pt denies any suicidal ideation intent or plan Pt claiming to sleep well and denies anxiety   mitigating continues with medication  protective pt with place to live , has family   chronic marital problems

## 2024-01-24 NOTE — BH SOCIAL WORK INITIAL PSYCHOSOCIAL EVALUATION - NSBHCHILDLIVEWITH_PSY_ALL_CORE
CPS contacted the unit to advise that the children are safe and being cared for by their mother./Yes (specify)

## 2024-01-24 NOTE — BH SAFETY PLAN - ENVIRONMENT SAFETY 3:
I will call my wife Lillian, [phone] or Adrien Reynoso, [phone], or [phone] immediately if   I feel like harming myself in any way.

## 2024-01-24 NOTE — BH INPATIENT PSYCHIATRY ASSESSMENT NOTE - NSBHCHARTREVIEWVS_PSY_A_CORE FT
Vital Signs Last 24 Hrs  T(C): 36.4 (01-24-24 @ 07:49), Max: 36.4 (01-24-24 @ 07:49)  T(F): 97.6 (01-24-24 @ 07:49), Max: 97.6 (01-24-24 @ 07:49)  HR: --  BP: --  BP(mean): --  RR: 18 (01-24-24 @ 07:49) (18 - 18)  SpO2: 100% (01-24-24 @ 07:49) (100% - 100%)    Orthostatic VS  01-24-24 @ 07:49  Lying BP: --/-- HR: --  Sitting BP: 128/74 HR: 69  Standing BP: 114/86 HR: 81  Site: upper right arm  Mode: electronic  Orthostatic VS  01-23-24 @ 13:55  Lying BP: --/-- HR: --  Sitting BP: 124/78 HR: 85  Standing BP: 115/77 HR: 94  Site: --  Mode: --

## 2024-01-24 NOTE — BH SOCIAL WORK INITIAL PSYCHOSOCIAL EVALUATION - NSCMSPTSTRENGTHS_PSY_ALL_CORE
Financial stability/Highly motivated for treatment/Intact employment/Intact family/Supportive family

## 2024-01-25 LAB
GLUCOSE BLDC GLUCOMTR-MCNC: 129 MG/DL — HIGH (ref 70–99)
GLUCOSE BLDC GLUCOMTR-MCNC: 153 MG/DL — HIGH (ref 70–99)
GLUCOSE BLDC GLUCOMTR-MCNC: 179 MG/DL — HIGH (ref 70–99)
GLUCOSE BLDC GLUCOMTR-MCNC: 241 MG/DL — HIGH (ref 70–99)
GLUCOSE BLDC GLUCOMTR-MCNC: 246 MG/DL — HIGH (ref 70–99)
GLUCOSE BLDC GLUCOMTR-MCNC: 58 MG/DL — LOW (ref 70–99)
GLUCOSE BLDC GLUCOMTR-MCNC: 69 MG/DL — LOW (ref 70–99)

## 2024-01-25 PROCEDURE — 99233 SBSQ HOSP IP/OBS HIGH 50: CPT

## 2024-01-25 PROCEDURE — 99232 SBSQ HOSP IP/OBS MODERATE 35: CPT

## 2024-01-25 RX ORDER — INSULIN LISPRO 100/ML
5 VIAL (ML) SUBCUTANEOUS
Refills: 0 | Status: DISCONTINUED | OUTPATIENT
Start: 2024-01-25 | End: 2024-01-26

## 2024-01-25 RX ADMIN — INSULIN GLARGINE 15 UNIT(S): 100 INJECTION, SOLUTION SUBCUTANEOUS at 21:02

## 2024-01-25 RX ADMIN — BUPROPION HYDROCHLORIDE 150 MILLIGRAM(S): 150 TABLET, EXTENDED RELEASE ORAL at 11:51

## 2024-01-25 RX ADMIN — Medication 5 UNIT(S): at 18:23
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
## 2024-01-25 NOTE — BH INPATIENT PSYCHIATRY PROGRESS NOTE - NSBHFUPINTERVALHXFT_PSY_A_CORE
1/25/2024 Pt claiming he is sleeping better and is less depressed in mood .  Pt denies any hallucination . He denies any suicidal or homicidal ideation .  Pt in behavioral control .  Pt started on th wellbutrin and made the above request.  Currently will remain at 150mg of the wellbutrin and encourage pt to attend groups

## 2024-01-25 NOTE — BH INPATIENT PSYCHIATRY PROGRESS NOTE - CURRENT MEDICATION
MEDICATIONS  (STANDING):  buPROPion XL (24-Hour) 150 milliGRAM(s) Oral daily  dextrose 5%. 1000 milliLiter(s) (100 mL/Hr) IV Continuous <Continuous>  dextrose 5%. 1000 milliLiter(s) (50 mL/Hr) IV Continuous <Continuous>  dextrose 50% Injectable 12.5 Gram(s) IV Push once  dextrose 50% Injectable 25 Gram(s) IV Push once  dextrose 50% Injectable 25 Gram(s) IV Push once  glucagon  Injectable 1 milliGRAM(s) IntraMuscular once  insulin glargine Injectable (LANTUS) 15 Unit(s) SubCutaneous at bedtime  insulin lispro (ADMELOG) corrective regimen sliding scale   SubCutaneous three times a day before meals  insulin lispro Injectable (ADMELOG) 4 Unit(s) SubCutaneous three times a day before meals    MEDICATIONS  (PRN):  dextrose Oral Gel 15 Gram(s) Oral once PRN Blood Glucose LESS THAN 70 milliGRAM(s)/deciliter  diphenhydrAMINE 50 milliGRAM(s) Oral every 6 hours PRN Extrapyramidal prophylaxis  diphenhydrAMINE 50 milliGRAM(s) Oral once PRN Extrapyramidal prophylaxis  diphenhydrAMINE Injectable 50 milliGRAM(s) IntraMuscular Once PRN Extrapyramidal prophylaxis  haloperidol     Tablet 5 milliGRAM(s) Oral every 6 hours PRN moderate psychotic agitation  haloperidol    Injectable 5 milliGRAM(s) IntraMuscular Once PRN severe psychotic agitation

## 2024-01-25 NOTE — BH INPATIENT PSYCHIATRY PROGRESS NOTE - NSBHASSESSSUMMFT_PSY_ALL_CORE
low risk as the pt denies any suicidal or homicidal ideation .  Pt seems not concerned about thew CPS meeting   mitigating pt taking medication   protective pt wih place to live , family   chronic pt with marital problems

## 2024-01-25 NOTE — PROGRESS NOTE ADULT - ASSESSMENT
Plan:  1. SI   - Management as per psychiatry     2. Mood Disorder, Depression/Anxiety  - Management as per psychiatry    3. Diabetes Type 1  - Elevated A1C: 11.0  - Long acting insulin: Lantus 12 units   -add pre meals insuilne  -increase premeal insuline dose today   -better reading now     4. DVT PPX  - Encourage ambulation

## 2024-01-26 LAB
GLUCOSE BLDC GLUCOMTR-MCNC: 216 MG/DL — HIGH (ref 70–99)
GLUCOSE BLDC GLUCOMTR-MCNC: 241 MG/DL — HIGH (ref 70–99)
GLUCOSE BLDC GLUCOMTR-MCNC: 252 MG/DL — HIGH (ref 70–99)
GLUCOSE BLDC GLUCOMTR-MCNC: 283 MG/DL — HIGH (ref 70–99)

## 2024-01-26 PROCEDURE — 99232 SBSQ HOSP IP/OBS MODERATE 35: CPT

## 2024-01-26 RX ORDER — INSULIN LISPRO 100/ML
VIAL (ML) SUBCUTANEOUS AT BEDTIME
Refills: 0 | Status: DISCONTINUED | OUTPATIENT
Start: 2024-01-26 | End: 2024-01-27

## 2024-01-26 RX ORDER — DEXTROSE 50 % IN WATER 50 %
25 SYRINGE (ML) INTRAVENOUS ONCE
Refills: 0 | Status: DISCONTINUED | OUTPATIENT
Start: 2024-01-26 | End: 2024-01-27

## 2024-01-26 RX ORDER — INSULIN LISPRO 100/ML
VIAL (ML) SUBCUTANEOUS AT BEDTIME
Refills: 0 | Status: DISCONTINUED | OUTPATIENT
Start: 2024-01-26 | End: 2024-01-26

## 2024-01-26 RX ORDER — GLUCAGON INJECTION, SOLUTION 0.5 MG/.1ML
1 INJECTION, SOLUTION SUBCUTANEOUS ONCE
Refills: 0 | Status: DISCONTINUED | OUTPATIENT
Start: 2024-01-26 | End: 2024-01-27

## 2024-01-26 RX ORDER — BUPROPION HYDROCHLORIDE 150 MG/1
150 TABLET, EXTENDED RELEASE ORAL
Refills: 0 | Status: DISCONTINUED | OUTPATIENT
Start: 2024-01-27 | End: 2024-02-02

## 2024-01-26 RX ORDER — INSULIN LISPRO 100/ML
7 VIAL (ML) SUBCUTANEOUS
Refills: 0 | Status: DISCONTINUED | OUTPATIENT
Start: 2024-01-26 | End: 2024-01-27

## 2024-01-26 RX ORDER — DEXTROSE 50 % IN WATER 50 %
15 SYRINGE (ML) INTRAVENOUS ONCE
Refills: 0 | Status: DISCONTINUED | OUTPATIENT
Start: 2024-01-26 | End: 2024-01-27

## 2024-01-26 RX ORDER — INSULIN LISPRO 100/ML
VIAL (ML) SUBCUTANEOUS
Refills: 0 | Status: DISCONTINUED | OUTPATIENT
Start: 2024-01-26 | End: 2024-01-26

## 2024-01-26 RX ORDER — INSULIN GLARGINE 100 [IU]/ML
15 INJECTION, SOLUTION SUBCUTANEOUS AT BEDTIME
Refills: 0 | Status: DISCONTINUED | OUTPATIENT
Start: 2024-01-26 | End: 2024-01-27

## 2024-01-26 RX ORDER — INSULIN LISPRO 100/ML
VIAL (ML) SUBCUTANEOUS
Refills: 0 | Status: DISCONTINUED | OUTPATIENT
Start: 2024-01-26 | End: 2024-01-27

## 2024-01-26 RX ORDER — DEXTROSE 50 % IN WATER 50 %
12.5 SYRINGE (ML) INTRAVENOUS ONCE
Refills: 0 | Status: DISCONTINUED | OUTPATIENT
Start: 2024-01-26 | End: 2024-01-27

## 2024-01-26 RX ORDER — INSULIN GLARGINE 100 [IU]/ML
18 INJECTION, SOLUTION SUBCUTANEOUS AT BEDTIME
Refills: 0 | Status: DISCONTINUED | OUTPATIENT
Start: 2024-01-26 | End: 2024-01-26

## 2024-01-26 RX ORDER — INSULIN LISPRO 100/ML
8 VIAL (ML) SUBCUTANEOUS
Refills: 0 | Status: DISCONTINUED | OUTPATIENT
Start: 2024-01-26 | End: 2024-01-26

## 2024-01-26 RX ADMIN — Medication 5 UNIT(S): at 12:17

## 2024-01-26 RX ADMIN — Medication 3: at 06:58

## 2024-01-26 RX ADMIN — INSULIN GLARGINE 15 UNIT(S): 100 INJECTION, SOLUTION SUBCUTANEOUS at 20:51

## 2024-01-26 RX ADMIN — Medication 3: at 12:18

## 2024-01-26 RX ADMIN — Medication 5 UNIT(S): at 06:58

## 2024-01-26 RX ADMIN — Medication 7 UNIT(S): at 17:12

## 2024-01-26 RX ADMIN — Medication 2: at 17:13

## 2024-01-26 RX ADMIN — BUPROPION HYDROCHLORIDE 150 MILLIGRAM(S): 150 TABLET, EXTENDED RELEASE ORAL at 09:13

## 2024-01-26 NOTE — PROGRESS NOTE ADULT - ASSESSMENT
Diabetes Type 1  Elevated A1c 11.0. Glucose control remains suboptimal. Glucose 250s-280s today despite regimen of Lantus/Lispro.   - Increase Lispro to 8 units with meals plus correctional scale, continue Lantus 15 units nightly  - Continue to monitor glucose TID AC and HS

## 2024-01-26 NOTE — BH INPATIENT PSYCHIATRY PROGRESS NOTE - NSBHASSESSSUMMFT_PSY_ALL_CORE
pt denies any suicidal ideation intent or plan Pt with marital issues, CPS still involved   mitigating pt willing to continue with medication   protective pt with family , is , no prior psych hosp   chronic pt with marital problems

## 2024-01-26 NOTE — CHART NOTE - NSCHARTNOTEFT_GEN_A_CORE
SW spoke with pt's wife today, as pt previously signed consent. SW updated wife on conversation with psychiatrist regarding pt's disability paperwork. SW deferred to psychiatrist regarding any further questions about pt's disability paperwork. SW discussed discharge planning with wife including [facility] as option. Wife inquired about partial, but did say she is unsure if pt got benefit from the program when he was there in the past. SW discussed that any referral SW makes pt would need to be agreeable to. Wife indicated that she has a refrain from order in place at this time and has concerns about pt returning home. Per wife, her mother offered for pt to stay with her upon discharge but pt not receptive at this time. Wife interested in family meeting to discuss her concerns with pt. SW agreeable to discuss potential family meeting with psychiatrist. SW discussed that CPS will be meeting with pt on Monday and that the meeting may provide additional insight into planning for pt. Wife also indicated she has questions about pt's diabetes treatment while in the hospital. SW deferred to RN/ psychiatrist regarding pt's diabetes and offered to transfer pt to nurses station to speak with RN. Wife agreeable. SW transferred wife to nurses station. SW confirmed with unit secretary that wife did speak with pt's nurse. SW also discussed case with psychiatrist. Per conversation with psychiatrist, CPS to meet with pt on Monday. Further DC planning and decision regarding family meeting to be discussed after CPS meets with pt. SW to continue to follow.

## 2024-01-26 NOTE — BH INPATIENT PSYCHIATRY PROGRESS NOTE - NSBHFUPINTERVALHXFT_PSY_A_CORE
1'/26/2024 Pt with plans to apply for disability .  Pt with continued decreasing depression and reports that sleep is still good . Pt remains superficial and denies any worsening of depression or any other concerns. Pt continues with minimizing symptoms and denies any suicidal or homicidal ideation intent or plan   1/25/2024 Pt claiming he is sleeping better and is less depressed in mood .  Pt denies any hallucination . He denies any suicidal or homicidal ideation .  Pt in behavioral control .  Pt started on th Wellbutrin and made the above request.  Currently will remain at 150mg of the Wellbutrin and encourage pt to attend groups

## 2024-01-26 NOTE — BH INPATIENT PSYCHIATRY PROGRESS NOTE - CURRENT MEDICATION
MEDICATIONS  (STANDING):  buPROPion XL (24-Hour) 150 milliGRAM(s) Oral daily  dextrose 5%. 1000 milliLiter(s) (50 mL/Hr) IV Continuous <Continuous>  dextrose 5%. 1000 milliLiter(s) (100 mL/Hr) IV Continuous <Continuous>  dextrose 50% Injectable 25 Gram(s) IV Push once  dextrose 50% Injectable 12.5 Gram(s) IV Push once  dextrose 50% Injectable 25 Gram(s) IV Push once  glucagon  Injectable 1 milliGRAM(s) IntraMuscular once  insulin glargine Injectable (LANTUS) 15 Unit(s) SubCutaneous at bedtime  insulin lispro (ADMELOG) corrective regimen sliding scale.   SubCutaneous three times a day before meals  insulin lispro (ADMELOG) corrective regimen sliding scale.   SubCutaneous at bedtime  insulin lispro Injectable (ADMELOG) 7 Unit(s) SubCutaneous three times a day before meals    MEDICATIONS  (PRN):  dextrose Oral Gel 15 Gram(s) Oral once PRN Blood Glucose LESS THAN 70 milliGRAM(s)/deciliter  diphenhydrAMINE 50 milliGRAM(s) Oral every 6 hours PRN Extrapyramidal prophylaxis  diphenhydrAMINE 50 milliGRAM(s) Oral once PRN Extrapyramidal prophylaxis  diphenhydrAMINE Injectable 50 milliGRAM(s) IntraMuscular Once PRN Extrapyramidal prophylaxis  haloperidol     Tablet 5 milliGRAM(s) Oral every 6 hours PRN moderate psychotic agitation  haloperidol    Injectable 5 milliGRAM(s) IntraMuscular Once PRN severe psychotic agitation

## 2024-01-27 LAB
GLUCOSE BLDC GLUCOMTR-MCNC: 124 MG/DL — HIGH (ref 70–99)
GLUCOSE BLDC GLUCOMTR-MCNC: 151 MG/DL — HIGH (ref 70–99)
GLUCOSE BLDC GLUCOMTR-MCNC: 253 MG/DL — HIGH (ref 70–99)
GLUCOSE BLDC GLUCOMTR-MCNC: 264 MG/DL — HIGH (ref 70–99)
GLUCOSE BLDC GLUCOMTR-MCNC: 382 MG/DL — HIGH (ref 70–99)

## 2024-01-27 PROCEDURE — 99232 SBSQ HOSP IP/OBS MODERATE 35: CPT

## 2024-01-27 RX ORDER — DEXTROSE 50 % IN WATER 50 %
25 SYRINGE (ML) INTRAVENOUS ONCE
Refills: 0 | Status: DISCONTINUED | OUTPATIENT
Start: 2024-01-27 | End: 2024-02-02

## 2024-01-27 RX ORDER — SODIUM CHLORIDE 9 MG/ML
1000 INJECTION, SOLUTION INTRAVENOUS
Refills: 0 | Status: DISCONTINUED | OUTPATIENT
Start: 2024-01-27 | End: 2024-02-02

## 2024-01-27 RX ORDER — INSULIN GLARGINE 100 [IU]/ML
22 INJECTION, SOLUTION SUBCUTANEOUS AT BEDTIME
Refills: 0 | Status: DISCONTINUED | OUTPATIENT
Start: 2024-01-27 | End: 2024-02-02

## 2024-01-27 RX ORDER — DEXTROSE 50 % IN WATER 50 %
12.5 SYRINGE (ML) INTRAVENOUS ONCE
Refills: 0 | Status: DISCONTINUED | OUTPATIENT
Start: 2024-01-27 | End: 2024-02-02

## 2024-01-27 RX ORDER — DEXTROSE 50 % IN WATER 50 %
15 SYRINGE (ML) INTRAVENOUS ONCE
Refills: 0 | Status: DISCONTINUED | OUTPATIENT
Start: 2024-01-27 | End: 2024-02-02

## 2024-01-27 RX ORDER — INSULIN LISPRO 100/ML
9 VIAL (ML) SUBCUTANEOUS
Refills: 0 | Status: DISCONTINUED | OUTPATIENT
Start: 2024-01-27 | End: 2024-02-02

## 2024-01-27 RX ORDER — INSULIN LISPRO 100/ML
VIAL (ML) SUBCUTANEOUS
Refills: 0 | Status: DISCONTINUED | OUTPATIENT
Start: 2024-01-27 | End: 2024-02-02

## 2024-01-27 RX ORDER — INSULIN LISPRO 100/ML
VIAL (ML) SUBCUTANEOUS AT BEDTIME
Refills: 0 | Status: DISCONTINUED | OUTPATIENT
Start: 2024-01-27 | End: 2024-02-02

## 2024-01-27 RX ORDER — GLUCAGON INJECTION, SOLUTION 0.5 MG/.1ML
1 INJECTION, SOLUTION SUBCUTANEOUS ONCE
Refills: 0 | Status: DISCONTINUED | OUTPATIENT
Start: 2024-01-27 | End: 2024-02-02

## 2024-01-27 RX ADMIN — INSULIN GLARGINE 18 UNIT(S): 100 INJECTION, SOLUTION SUBCUTANEOUS at 21:41

## 2024-01-27 RX ADMIN — Medication 5: at 08:17

## 2024-01-27 RX ADMIN — Medication 50 MILLIGRAM(S): at 21:49

## 2024-01-27 RX ADMIN — Medication 8 UNIT(S): at 17:51

## 2024-01-27 RX ADMIN — Medication 8 UNIT(S): at 12:04

## 2024-01-27 RX ADMIN — Medication 2: at 21:40

## 2024-01-27 RX ADMIN — BUPROPION HYDROCHLORIDE 150 MILLIGRAM(S): 150 TABLET, EXTENDED RELEASE ORAL at 09:43

## 2024-01-27 RX ADMIN — Medication 7 UNIT(S): at 08:17

## 2024-01-27 RX ADMIN — Medication 50 MILLIGRAM(S): at 01:17

## 2024-01-27 RX ADMIN — BUPROPION HYDROCHLORIDE 150 MILLIGRAM(S): 150 TABLET, EXTENDED RELEASE ORAL at 17:51

## 2024-01-27 RX ADMIN — Medication 1: at 17:51

## 2024-01-27 NOTE — PROGRESS NOTE ADULT - ASSESSMENT
Diabetes Type 1  Elevated A1c 11.0. Glucose control remains suboptimal. Glucose 260s-380s today despite regimen of Lantus/Lispro.   - Increase Lispro to 10 units with meals plus correctional scale, continue Lantus with increase to 18 units nightly  - Continue to monitor glucose TID AC and HS

## 2024-01-27 NOTE — BH INPATIENT PSYCHIATRY PROGRESS NOTE - NSBHFUPINTERVALHXFT_PSY_A_CORE
1/27/2024 Pt continues with the wellbutrin and claming to be feeling well.  Pt with continued superficial cooperation and minimizing issues.  Ot is aware that his wife has requested of him to live else where and not to return home directly .  CPS meeting is pending and will need family meeting to include the pt , wife and CSW and myself. prior tp aftercare planning   1'/26/2024 Pt with plans to apply for disability .  Pt with continued decreasing depression and reports that sleep is still good . Pt remains superficial and denies any worsening of depression or any other concerns. Pt continues with minimizing symptoms and denies any suicidal or homicidal ideation intent or plan   1/25/2024 Pt claiming he is sleeping better and is less depressed in mood .  Pt denies any hallucination . He denies any suicidal or homicidal ideation .  Pt in behavioral control .  Pt started on th Wellbutrin and made the above request.  Currently will remain at 150mg of the Wellbutrin and encourage pt to attend groups

## 2024-01-27 NOTE — BH INPATIENT PSYCHIATRY PROGRESS NOTE - CURRENT MEDICATION
MEDICATIONS  (STANDING):  buPROPion XL (24-Hour) 150 milliGRAM(s) Oral <User Schedule>  dextrose 5%. 1000 milliLiter(s) (100 mL/Hr) IV Continuous <Continuous>  dextrose 5%. 1000 milliLiter(s) (50 mL/Hr) IV Continuous <Continuous>  dextrose 50% Injectable 25 Gram(s) IV Push once  dextrose 50% Injectable 25 Gram(s) IV Push once  dextrose 50% Injectable 12.5 Gram(s) IV Push once  glucagon  Injectable 1 milliGRAM(s) IntraMuscular once  insulin glargine Injectable (LANTUS) 18 Unit(s) SubCutaneous at bedtime  insulin lispro (ADMELOG) corrective regimen sliding scale   SubCutaneous three times a day before meals  insulin lispro (ADMELOG) corrective regimen sliding scale   SubCutaneous at bedtime  insulin lispro Injectable (ADMELOG) 8 Unit(s) SubCutaneous three times a day before meals    MEDICATIONS  (PRN):  dextrose Oral Gel 15 Gram(s) Oral once PRN Blood Glucose LESS THAN 70 milliGRAM(s)/deciliter  diphenhydrAMINE 50 milliGRAM(s) Oral every 6 hours PRN Extrapyramidal prophylaxis  diphenhydrAMINE 50 milliGRAM(s) Oral once PRN Extrapyramidal prophylaxis  diphenhydrAMINE Injectable 50 milliGRAM(s) IntraMuscular Once PRN Extrapyramidal prophylaxis  haloperidol     Tablet 5 milliGRAM(s) Oral every 6 hours PRN moderate psychotic agitation  haloperidol    Injectable 5 milliGRAM(s) IntraMuscular Once PRN severe psychotic agitation

## 2024-01-27 NOTE — BH INPATIENT PSYCHIATRY PROGRESS NOTE - NSBHASSESSSUMMFT_PSY_ALL_CORE
low risk for suicide as the pt denieas  any suicidal ideation intent or plan Pt with no prior hx of self injury according to the pt . Pt continues with minimizing information    mitigating pt willing to continue with medication   protective pt with family . involvement with CPS  chronic pt with marital problems

## 2024-01-28 LAB
GLUCOSE BLDC GLUCOMTR-MCNC: 132 MG/DL — HIGH (ref 70–99)
GLUCOSE BLDC GLUCOMTR-MCNC: 198 MG/DL — HIGH (ref 70–99)
GLUCOSE BLDC GLUCOMTR-MCNC: 280 MG/DL — HIGH (ref 70–99)
GLUCOSE BLDC GLUCOMTR-MCNC: 315 MG/DL — HIGH (ref 70–99)
GLUCOSE BLDC GLUCOMTR-MCNC: 420 MG/DL — HIGH (ref 70–99)
GLUCOSE BLDC GLUCOMTR-MCNC: 97 MG/DL — SIGNIFICANT CHANGE UP (ref 70–99)

## 2024-01-28 PROCEDURE — 99232 SBSQ HOSP IP/OBS MODERATE 35: CPT

## 2024-01-28 RX ORDER — LANOLIN ALCOHOL/MO/W.PET/CERES
5 CREAM (GRAM) TOPICAL ONCE
Refills: 0 | Status: COMPLETED | OUTPATIENT
Start: 2024-01-28 | End: 2024-01-28

## 2024-01-28 RX ADMIN — Medication 5 MILLIGRAM(S): at 23:17

## 2024-01-28 RX ADMIN — Medication 4: at 17:35

## 2024-01-28 RX ADMIN — Medication 9 UNIT(S): at 17:35

## 2024-01-28 RX ADMIN — Medication 3: at 07:05

## 2024-01-28 RX ADMIN — Medication 8 UNIT(S): at 12:17

## 2024-01-28 RX ADMIN — Medication 1: at 12:16

## 2024-01-28 RX ADMIN — INSULIN GLARGINE 22 UNIT(S): 100 INJECTION, SOLUTION SUBCUTANEOUS at 20:03

## 2024-01-28 RX ADMIN — Medication 8 UNIT(S): at 07:09

## 2024-01-28 RX ADMIN — Medication 5 MILLIGRAM(S): at 01:07

## 2024-01-28 RX ADMIN — BUPROPION HYDROCHLORIDE 150 MILLIGRAM(S): 150 TABLET, EXTENDED RELEASE ORAL at 17:36

## 2024-01-28 RX ADMIN — BUPROPION HYDROCHLORIDE 150 MILLIGRAM(S): 150 TABLET, EXTENDED RELEASE ORAL at 10:05

## 2024-01-28 NOTE — BH INPATIENT PSYCHIATRY PROGRESS NOTE - NSBHASSESSSUMMFT_PSY_ALL_CORE
pt denies any suicidal ideation intent or plan  pt continues with minimization of symptoms and denies any depression or anxiety ,   mitigating pt with continuation of medication   protective is , CPS is involved   chronic pt with marital issues

## 2024-01-28 NOTE — BH INPATIENT PSYCHIATRY PROGRESS NOTE - CURRENT MEDICATION
MEDICATIONS  (STANDING):  buPROPion XL (24-Hour) 150 milliGRAM(s) Oral <User Schedule>  dextrose 5%. 1000 milliLiter(s) (50 mL/Hr) IV Continuous <Continuous>  dextrose 5%. 1000 milliLiter(s) (100 mL/Hr) IV Continuous <Continuous>  dextrose 50% Injectable 25 Gram(s) IV Push once  dextrose 50% Injectable 25 Gram(s) IV Push once  dextrose 50% Injectable 12.5 Gram(s) IV Push once  glucagon  Injectable 1 milliGRAM(s) IntraMuscular once  insulin glargine Injectable (LANTUS) 22 Unit(s) SubCutaneous at bedtime  insulin lispro (ADMELOG) corrective regimen sliding scale   SubCutaneous at bedtime  insulin lispro (ADMELOG) corrective regimen sliding scale   SubCutaneous three times a day before meals  insulin lispro Injectable (ADMELOG) 9 Unit(s) SubCutaneous three times a day before meals    MEDICATIONS  (PRN):  dextrose Oral Gel 15 Gram(s) Oral once PRN Blood Glucose LESS THAN 70 milliGRAM(s)/deciliter  diphenhydrAMINE 50 milliGRAM(s) Oral every 6 hours PRN Extrapyramidal prophylaxis  diphenhydrAMINE Injectable 50 milliGRAM(s) IntraMuscular Once PRN Extrapyramidal prophylaxis  haloperidol     Tablet 5 milliGRAM(s) Oral every 6 hours PRN moderate psychotic agitation  haloperidol    Injectable 5 milliGRAM(s) IntraMuscular Once PRN severe psychotic agitation

## 2024-01-28 NOTE — BH INPATIENT PSYCHIATRY PROGRESS NOTE - NSBHFUPINTERVALHXFT_PSY_A_CORE
1/28/2024 Pt not appearing to be in any distress  Pt with interaction with staff and peers.  Pt is aware of wife request that he stay with her mother. Pt willing to have a family meeting    1/27/2024 Pt continues with the Wellbutrin and claiming to be feeling well.  Pt with continued superficial cooperation and minimizing issues.  Ot is aware that his wife has requested of him to live else where and not to return home directly .  CPS meeting is pending and will need family meeting to include the pt , wife and CSW and myself. prior tp aftercare planning   1'/26/2024 Pt with plans to apply for disability .  Pt with continued decreasing depression and reports that sleep is still good . Pt remains superficial and denies any worsening of depression or any other concerns. Pt continues with minimizing symptoms and denies any suicidal or homicidal ideation intent or plan   1/25/2024 Pt claiming he is sleeping better and is less depressed in mood .  Pt denies any hallucination . He denies any suicidal or homicidal ideation .  Pt in behavioral control .  Pt started on th Wellbutrin and made the above request.  Currently will remain at 150mg of the Wellbutrin and encourage pt to attend groups

## 2024-01-28 NOTE — PROGRESS NOTE ADULT - ASSESSMENT
Diabetes Type 1  Elevated A1c 11.0. Glucose control remains suboptimal. Glucose 250s-280s despite regimen of Lantus/Lispro.   - Increase Lantus/Lispro   - Continue to monitor glucose TID AC and HS

## 2024-01-29 LAB
GLUCOSE BLDC GLUCOMTR-MCNC: 101 MG/DL — HIGH (ref 70–99)
GLUCOSE BLDC GLUCOMTR-MCNC: 112 MG/DL — HIGH (ref 70–99)
GLUCOSE BLDC GLUCOMTR-MCNC: 167 MG/DL — HIGH (ref 70–99)
GLUCOSE BLDC GLUCOMTR-MCNC: 189 MG/DL — HIGH (ref 70–99)

## 2024-01-29 PROCEDURE — 99232 SBSQ HOSP IP/OBS MODERATE 35: CPT

## 2024-01-29 RX ADMIN — Medication 9 UNIT(S): at 12:07

## 2024-01-29 RX ADMIN — BUPROPION HYDROCHLORIDE 150 MILLIGRAM(S): 150 TABLET, EXTENDED RELEASE ORAL at 17:52

## 2024-01-29 RX ADMIN — Medication 1: at 17:52

## 2024-01-29 RX ADMIN — INSULIN GLARGINE 22 UNIT(S): 100 INJECTION, SOLUTION SUBCUTANEOUS at 20:36

## 2024-01-29 RX ADMIN — Medication 9 UNIT(S): at 07:16

## 2024-01-29 RX ADMIN — Medication 1: at 12:06

## 2024-01-29 RX ADMIN — Medication 9 UNIT(S): at 17:54

## 2024-01-29 RX ADMIN — BUPROPION HYDROCHLORIDE 150 MILLIGRAM(S): 150 TABLET, EXTENDED RELEASE ORAL at 08:14

## 2024-01-29 NOTE — BH INPATIENT PSYCHIATRY PROGRESS NOTE - NSBHADMITMEDEDUDETAILS_A_CORE FT
pt is educated about the use of medication and of the potential for side effects.  Pt willing to continue with the wellbutrin .

## 2024-01-29 NOTE — PROGRESS NOTE ADULT - ASSESSMENT
Diabetes Type 1  Elevated A1c 11.0. With recent adjustments to insulin regimen, glycemic control now appears to have improved today. Fasting glucose 101 this AM. Pre-lunch 189.   - Continue Lantus now at 22 units nightly, Lispro 9 units with meals plus mealtime and bedtime correctional scales  - Continue to monitor glucose TID AC and HS

## 2024-01-29 NOTE — BH INPATIENT PSYCHIATRY PROGRESS NOTE - CURRENT MEDICATION
MEDICATIONS  (STANDING):  buPROPion XL (24-Hour) 150 milliGRAM(s) Oral <User Schedule>  dextrose 5%. 1000 milliLiter(s) (100 mL/Hr) IV Continuous <Continuous>  dextrose 5%. 1000 milliLiter(s) (50 mL/Hr) IV Continuous <Continuous>  dextrose 50% Injectable 25 Gram(s) IV Push once  dextrose 50% Injectable 12.5 Gram(s) IV Push once  dextrose 50% Injectable 25 Gram(s) IV Push once  glucagon  Injectable 1 milliGRAM(s) IntraMuscular once  insulin glargine Injectable (LANTUS) 22 Unit(s) SubCutaneous at bedtime  insulin lispro (ADMELOG) corrective regimen sliding scale   SubCutaneous three times a day before meals  insulin lispro (ADMELOG) corrective regimen sliding scale   SubCutaneous at bedtime  insulin lispro Injectable (ADMELOG) 9 Unit(s) SubCutaneous three times a day before meals    MEDICATIONS  (PRN):  dextrose Oral Gel 15 Gram(s) Oral once PRN Blood Glucose LESS THAN 70 milliGRAM(s)/deciliter  diphenhydrAMINE 50 milliGRAM(s) Oral every 6 hours PRN Extrapyramidal prophylaxis  diphenhydrAMINE Injectable 50 milliGRAM(s) IntraMuscular Once PRN Extrapyramidal prophylaxis  haloperidol     Tablet 5 milliGRAM(s) Oral every 6 hours PRN moderate psychotic agitation  haloperidol    Injectable 5 milliGRAM(s) IntraMuscular Once PRN severe psychotic agitation

## 2024-01-29 NOTE — BH INPATIENT PSYCHIATRY PROGRESS NOTE - NSBHASSESSSUMMFT_PSY_ALL_CORE
low to moderate risk , the pt denies any suicidal ideation intent or plan , he continues to dismiss any added stress and claiming no depression or anxiety.   pt continues to minimize any symptoms   mitigating pt remains on medication   protective has family , CPS involvement , still has family support , employment hx   chronic pt with self reported "anger issues", CPS involvement, legal issues.

## 2024-01-29 NOTE — BH INPATIENT PSYCHIATRY PROGRESS NOTE - NSBHFUPINTERVALHXFT_PSY_A_CORE
1/29/2024 Read hospitalist note  and appreciated input towards pt treatment. Pt is aware of the court will be deciding the extent of the order of protection on Wednesday of this week. It is possible that it would be a stay away as the wife has mentioned previously that she is uncomfortable with the pt returning to the home where she and the children and are residing . As part of a safe discharge planning for the pt will need to know plan of where the pt will be staying in order to place aftercare treatment , and which pharmacy to direct his prescriptions. At this time pt continues with minimization of symptoms and still indicates that he is   with a euthymic mood and is not appearing to be under any duress. He denies any suicidal ideation intent or plan   1/28/2024 Pt not appearing to be in any distress  Pt with interaction with staff and peers.  Pt is aware of wife request that he stay with her mother. Pt willing to have a family meeting    1/27/2024 Pt continues with the Wellbutrin and claiming to be feeling well.  Pt with continued superficial cooperation and minimizing issues.  Ot is aware that his wife has requested of him to live else where and not to return home directly .  CPS meeting is pending and will need family meeting to include the pt , wife and CSW and myself. prior tp aftercare planning   1'/26/2024 Pt with plans to apply for disability .  Pt with continued decreasing depression and reports that sleep is still good . Pt remains superficial and denies any worsening of depression or any other concerns. Pt continues with minimizing symptoms and denies any suicidal or homicidal ideation intent or plan   1/25/2024 Pt claiming he is sleeping better and is less depressed in mood .  Pt denies any hallucination . He denies any suicidal or homicidal ideation .  Pt in behavioral control .  Pt started on th Wellbutrin and made the above request.  Currently will remain at 150mg of the Wellbutrin and encourage pt to attend groups

## 2024-01-30 LAB
GLUCOSE BLDC GLUCOMTR-MCNC: 105 MG/DL — HIGH (ref 70–99)
GLUCOSE BLDC GLUCOMTR-MCNC: 170 MG/DL — HIGH (ref 70–99)
GLUCOSE BLDC GLUCOMTR-MCNC: 185 MG/DL — HIGH (ref 70–99)
GLUCOSE BLDC GLUCOMTR-MCNC: 185 MG/DL — HIGH (ref 70–99)

## 2024-01-30 PROCEDURE — 99233 SBSQ HOSP IP/OBS HIGH 50: CPT

## 2024-01-30 PROCEDURE — 99232 SBSQ HOSP IP/OBS MODERATE 35: CPT

## 2024-01-30 RX ADMIN — Medication 1: at 18:12

## 2024-01-30 RX ADMIN — Medication 1: at 11:59

## 2024-01-30 RX ADMIN — Medication 1: at 07:19

## 2024-01-30 RX ADMIN — Medication 9 UNIT(S): at 12:00

## 2024-01-30 RX ADMIN — HALOPERIDOL DECANOATE 5 MILLIGRAM(S): 100 INJECTION INTRAMUSCULAR at 21:25

## 2024-01-30 RX ADMIN — INSULIN GLARGINE 22 UNIT(S): 100 INJECTION, SOLUTION SUBCUTANEOUS at 21:25

## 2024-01-30 RX ADMIN — Medication 50 MILLIGRAM(S): at 21:25

## 2024-01-30 RX ADMIN — BUPROPION HYDROCHLORIDE 150 MILLIGRAM(S): 150 TABLET, EXTENDED RELEASE ORAL at 09:19

## 2024-01-30 RX ADMIN — BUPROPION HYDROCHLORIDE 150 MILLIGRAM(S): 150 TABLET, EXTENDED RELEASE ORAL at 16:33

## 2024-01-30 RX ADMIN — Medication 9 UNIT(S): at 07:19

## 2024-01-30 RX ADMIN — Medication 9 UNIT(S): at 18:12

## 2024-01-30 NOTE — PROGRESS NOTE ADULT - ASSESSMENT
Diabetes Type 1  Elevated A1c 11.0. With recent adjustments to insulin regimen, glycemic control now improved and stable - 100s-180s on current regimen of Lantus/Lispro.   - Continue Lantus now at 22 units nightly, Lispro 9 units with meals plus mealtime and bedtime correctional scales  - Continue to monitor glucose TID AC and HS    Upon discharge, if this inpatient regimen remains acceptable, and patient is to resume Toujeo and Fiasp, would do 26 units of Toujeo nightly (as Lantus and Toujeo are not quite 1:1) and 9 units of Fiasp plus same mealtime scale as he is presently receiving.       Will sign off at this time. Re-consult PRN.      Diabetes Type 1  Elevated A1c 11.0. With recent adjustments to insulin regimen, glycemic control now improved and stable - 100s-180s on current regimen of Lantus/Lispro.   - Continue Lantus now at 22 units nightly, Lispro 9 units with meals plus mealtime and bedtime correctional scales  - Continue to monitor glucose TID AC and HS    Upon discharge, if this inpatient regimen remains acceptable, and patient is to resume Toujeo and Fiasp, would do Toujeo 22 units nightly and Fiasp 9 units with meals plus same mealtime scale as he is presently receiving.       Will sign off at this time. Re-consult PRN.

## 2024-01-30 NOTE — PROGRESS NOTE ADULT - SUBJECTIVE AND OBJECTIVE BOX
HPI: This is a 43 year old male with a past medical history of DM, mood disorder, aggression depression, anxiety and alcohol use disorder(denies active use), who presented to the hospital for worsening SI. Admitted to Inpatient Psychiatry. Being followed by Medicine Consult for uncontrolled type I diabetes on insulin.    Interval Hx: Glucose remains suboptimally controlled, 250s-280s. Subsequently adjusted insulin regimen today.     ROS: Multi system review is comprehensively negative x systems     Exam:  Vitals WNL, stable.  Gen: Comfortable appearing  HEENT: NCAT PERRL EOMI MMM clear oropharynx  Neck: Supple, no JVD, no LAD  Chest: Normal respiratory effort, lungs CTA B/L  CVS: s1 s2, RRR  Abd: +BS, soft NT ND   Ext: No edema or calf tenderness, normal cap refill  Skin: Warm, dry  Mood: Calm, pleasant  Neuro: A+OX3, no gross deficits    Labs:  Glucose 250s-280s    Meds:  MEDICATIONS  (STANDING):  buPROPion XL (24-Hour) 150 milliGRAM(s) Oral daily  insulin glargine Injectable (LANTUS) 15 Unit(s) SubCutaneous at bedtime  insulin lispro (ADMELOG) corrective regimen sliding scale   SubCutaneous at bedtime  insulin lispro (ADMELOG) corrective regimen sliding scale   SubCutaneous three times a day before meals    MEDICATIONS  (PRN):  dextrose Oral Gel 15 Gram(s) Oral once PRN Blood Glucose LESS THAN 70 milliGRAM(s)/deciliter  diphenhydrAMINE 50 milliGRAM(s) Oral every 6 hours PRN Extrapyramidal prophylaxis  diphenhydrAMINE 50 milliGRAM(s) Oral once PRN Extrapyramidal prophylaxis  diphenhydrAMINE Injectable 50 milliGRAM(s) IntraMuscular Once PRN Extrapyramidal prophylaxis  haloperidol     Tablet 5 milliGRAM(s) Oral every 6 hours PRN moderate psychotic agitation  haloperidol    Injectable 5 milliGRAM(s) IntraMuscular Once PRN severe psychotic agitation        
HPI: This is a 43 year old male with a past medical history of DM, mood disorder, aggression depression, anxiety and alcohol use disorder(denies active use), who presented to the hospital for worsening SI. Admitted to Inpatient Psychiatry. Being followed by Medicine Consult for uncontrolled type I diabetes on insulin.    Interval Hx: Glucose remains suboptimally controlled, 260s-380s. Subsequently adjusted insulin regimen further today.     ROS: Multi system review is comprehensively negative x systems     Exam:  Vitals WNL, stable.  Gen: Comfortable appearing  HEENT: NCAT PERRL EOMI MMM clear oropharynx  Neck: Supple, no JVD, no LAD  Chest: Normal respiratory effort, lungs CTA B/L  CVS: s1 s2, RRR  Abd: +BS, soft NT ND   Ext: No edema or calf tenderness, normal cap refill  Skin: Warm, dry  Mood: Calm, pleasant  Neuro: A+OX3, no gross deficits    Labs:  Glucose 260s-380s    Meds:  MEDICATIONS  (STANDING):  buPROPion XL (24-Hour) 150 milliGRAM(s) Oral daily  insulin glargine Injectable (LANTUS) 15 Unit(s) SubCutaneous at bedtime  insulin lispro (ADMELOG) corrective regimen sliding scale   SubCutaneous at bedtime  insulin lispro (ADMELOG) corrective regimen sliding scale   SubCutaneous three times a day before meals    MEDICATIONS  (PRN):  dextrose Oral Gel 15 Gram(s) Oral once PRN Blood Glucose LESS THAN 70 milliGRAM(s)/deciliter  diphenhydrAMINE 50 milliGRAM(s) Oral every 6 hours PRN Extrapyramidal prophylaxis  diphenhydrAMINE 50 milliGRAM(s) Oral once PRN Extrapyramidal prophylaxis  diphenhydrAMINE Injectable 50 milliGRAM(s) IntraMuscular Once PRN Extrapyramidal prophylaxis  haloperidol     Tablet 5 milliGRAM(s) Oral every 6 hours PRN moderate psychotic agitation  haloperidol    Injectable 5 milliGRAM(s) IntraMuscular Once PRN severe psychotic agitation        
HPI: This is a 43 year old male with a past medical history of DM, mood disorder, aggression depression, anxiety and alcohol use disorder(denies active use), who presented to the hospital for worsening SI. Admitted to Inpatient Psychiatry. Being followed by Medicine Consult for uncontrolled type I diabetes on insulin.    Interval Hx: No acute complaints. With recent adjustments to insulin regimen, glycemic control now appears to have improved - glucose 110s-180s.    ROS: Multi system review is comprehensively negative x systems     Exam:  Vitals WNL, stable.  Gen: Comfortable appearing  HEENT: NCAT PERRL EOMI MMM clear oropharynx  Neck: Supple, no JVD, no LAD  Chest: Normal respiratory effort, lungs CTA B/L  CVS: s1 s2, RRR  Abd: +BS, soft NT ND   Ext: No edema or calf tenderness, normal cap refill  Skin: Warm, dry  Mood: Calm, pleasant  Neuro: A+OX3, no gross deficits    Labs:  Glucose 110s-180s today, stable    Meds:  MEDICATIONS  (STANDING):  buPROPion XL (24-Hour) 150 milliGRAM(s) Oral <User Schedule>  insulin glargine Injectable (LANTUS) 22 Unit(s) SubCutaneous at bedtime  insulin lispro (ADMELOG) corrective regimen sliding scale   SubCutaneous three times a day before meals  insulin lispro (ADMELOG) corrective regimen sliding scale   SubCutaneous at bedtime  insulin lispro Injectable (ADMELOG) 9 Unit(s) SubCutaneous three times a day before meals    MEDICATIONS  (PRN):  dextrose Oral Gel 15 Gram(s) Oral once PRN Blood Glucose LESS THAN 70 milliGRAM(s)/deciliter  diphenhydrAMINE 50 milliGRAM(s) Oral every 6 hours PRN Extrapyramidal prophylaxis  diphenhydrAMINE Injectable 50 milliGRAM(s) IntraMuscular Once PRN Extrapyramidal prophylaxis  haloperidol     Tablet 5 milliGRAM(s) Oral every 6 hours PRN moderate psychotic agitation  haloperidol    Injectable 5 milliGRAM(s) IntraMuscular Once PRN severe psychotic agitation  
  HPI:  This is a 43 year old male with a past medical history of DM, mood disorder, aggression depression, anxiety and alcohol use disorder(denies active use), who presented to the hospital for worsening SI.       Review of Systems:  CONSTITUTIONAL: No weakness, fevers or chills  EYES/ENT: No visual changes;  No vertigo or throat pain   NECK: No pain or stiffness  RESPIRATORY: No cough, wheezing, hemoptysis; No shortness of breath,   CARDIOVASCULAR: No chest pain or palpitations  GASTROINTESTINAL: No abdominal or epigastric pain. No nausea, vomiting, or hematemesis; No diarrhea or constipation.   GENITOURINARY: No dysuria, frequency or hematuria  NEUROLOGICAL: No numbness or weakness  SKIN: No itching, burning, rashes, or lesions   All other review of systems is negative unless indicated above    PHYSICAL EXAM:  Vital Signs Last 24 Hrs  T(C): 36.4 (25 Jan 2024 07:14), Max: 36.4 (25 Jan 2024 07:14)  T(F): 97.6 (25 Jan 2024 07:14), Max: 97.6 (25 Jan 2024 07:14)  HR: --  BP: --  BP(mean): --  RR: 18 (25 Jan 2024 07:14) (18 - 18)  SpO2: 100% (25 Jan 2024 07:14) (100% - 100%)    Parameters below as of 25 Jan 2024 07:14  Patient On (Oxygen Delivery Method): room air          GENERAL: comfortable   HEAD:  Atraumatic, Normocephalic  EYES: EOMI, PERRLA, conjunctiva and sclera clear  HEENT: Moist mucous membranes  NECK: Supple, No JVD  NERVOUS SYSTEM:  Alert & Oriented X3, Motor Strength 5/5 B/L upper and lower extremities; DTRs 2+ intact and symmetric  CHEST/LUNG: Clear to auscultation bilaterally; No rales, rhonchi, wheezing, or rubs  HEART:S1S2 normal, no murmer  ABDOMEN: Soft, Nontender, Nondistended; Bowel sounds present  GENITOURINARY- Voiding, no palpable bladder  EXTREMITIES:  2+ Peripheral Pulses, No clubbing, cyanosis, or edema  MUSCULOSKELTAL- No muscle tenderness, Muscle tone normal, No joint tenderness, no Joint swelling, Joint range of motion-normal  SKIN-no rash, no lesion  PSYCH- Mood stable  LYMPH Node- No palpable lymph node    LABS:                CAPILLARY BLOOD GLUCOSE      POCT Blood Glucose.: 234 mg/dL (24 Jan 2024 11:57)  POCT Blood Glucose.: 148 mg/dL (24 Jan 2024 07:50)  POCT Blood Glucose.: 306 mg/dL (23 Jan 2024 21:42)  POCT Blood Glucose.: 219 mg/dL (23 Jan 2024 17:51)            Standing medicine  buPROPion XL (24-Hour) 150 milliGRAM(s) Oral daily  dextrose 5%. 1000 milliLiter(s) IV Continuous <Continuous>  dextrose 5%. 1000 milliLiter(s) IV Continuous <Continuous>  dextrose 50% Injectable 25 Gram(s) IV Push once  dextrose 50% Injectable 25 Gram(s) IV Push once  dextrose 50% Injectable 12.5 Gram(s) IV Push once  dextrose Oral Gel 15 Gram(s) Oral once PRN  diphenhydrAMINE 50 milliGRAM(s) Oral every 6 hours PRN  diphenhydrAMINE 50 milliGRAM(s) Oral once PRN  diphenhydrAMINE Injectable 50 milliGRAM(s) IntraMuscular Once PRN  glucagon  Injectable 1 milliGRAM(s) IntraMuscular once  haloperidol     Tablet 5 milliGRAM(s) Oral every 6 hours PRN  haloperidol    Injectable 5 milliGRAM(s) IntraMuscular Once PRN  insulin glargine Injectable (LANTUS) 12 Unit(s) SubCutaneous at bedtime  insulin lispro (ADMELOG) corrective regimen sliding scale   SubCutaneous three times a day before meals  insulin lispro Injectable (ADMELOG) 4 Unit(s) SubCutaneous three times a day before meals        
  HPI:  This is a 43 year old male with a past medical history of DM, mood disorder, aggression depression, anxiety and alcohol use disorder(denies active use), who presented to the hospital for worsening SI.       Review of Systems:  CONSTITUTIONAL: No weakness, fevers or chills  EYES/ENT: No visual changes;  No vertigo or throat pain   NECK: No pain or stiffness  RESPIRATORY: No cough, wheezing, hemoptysis; No shortness of breath,   CARDIOVASCULAR: No chest pain or palpitations  GASTROINTESTINAL: No abdominal or epigastric pain. No nausea, vomiting, or hematemesis; No diarrhea or constipation.   GENITOURINARY: No dysuria, frequency or hematuria  NEUROLOGICAL: No numbness or weakness  SKIN: No itching, burning, rashes, or lesions   All other review of systems is negative unless indicated above    PHYSICAL EXAM:    Vital Signs Last 24 Hrs  T(C): 36.4 (24 Jan 2024 07:49), Max: 36.4 (24 Jan 2024 07:49)  T(F): 97.6 (24 Jan 2024 07:49), Max: 97.6 (24 Jan 2024 07:49)  HR: --  BP: --  BP(mean): --  RR: 18 (24 Jan 2024 07:49) (18 - 18)  SpO2: 100% (24 Jan 2024 07:49) (100% - 100%)        GENERAL: comfortable   HEAD:  Atraumatic, Normocephalic  EYES: EOMI, PERRLA, conjunctiva and sclera clear  HEENT: Moist mucous membranes  NECK: Supple, No JVD  NERVOUS SYSTEM:  Alert & Oriented X3, Motor Strength 5/5 B/L upper and lower extremities; DTRs 2+ intact and symmetric  CHEST/LUNG: Clear to auscultation bilaterally; No rales, rhonchi, wheezing, or rubs  HEART:S1S2 normal, no murmer  ABDOMEN: Soft, Nontender, Nondistended; Bowel sounds present  GENITOURINARY- Voiding, no palpable bladder  EXTREMITIES:  2+ Peripheral Pulses, No clubbing, cyanosis, or edema  MUSCULOSKELTAL- No muscle tenderness, Muscle tone normal, No joint tenderness, no Joint swelling, Joint range of motion-normal  SKIN-no rash, no lesion  PSYCH- Mood stable  LYMPH Node- No palpable lymph node    LABS:                CAPILLARY BLOOD GLUCOSE      POCT Blood Glucose.: 234 mg/dL (24 Jan 2024 11:57)  POCT Blood Glucose.: 148 mg/dL (24 Jan 2024 07:50)  POCT Blood Glucose.: 306 mg/dL (23 Jan 2024 21:42)  POCT Blood Glucose.: 219 mg/dL (23 Jan 2024 17:51)            Standing medicine  buPROPion XL (24-Hour) 150 milliGRAM(s) Oral daily  dextrose 5%. 1000 milliLiter(s) IV Continuous <Continuous>  dextrose 5%. 1000 milliLiter(s) IV Continuous <Continuous>  dextrose 50% Injectable 25 Gram(s) IV Push once  dextrose 50% Injectable 25 Gram(s) IV Push once  dextrose 50% Injectable 12.5 Gram(s) IV Push once  dextrose Oral Gel 15 Gram(s) Oral once PRN  diphenhydrAMINE 50 milliGRAM(s) Oral every 6 hours PRN  diphenhydrAMINE 50 milliGRAM(s) Oral once PRN  diphenhydrAMINE Injectable 50 milliGRAM(s) IntraMuscular Once PRN  glucagon  Injectable 1 milliGRAM(s) IntraMuscular once  haloperidol     Tablet 5 milliGRAM(s) Oral every 6 hours PRN  haloperidol    Injectable 5 milliGRAM(s) IntraMuscular Once PRN  insulin glargine Injectable (LANTUS) 12 Unit(s) SubCutaneous at bedtime  insulin lispro (ADMELOG) corrective regimen sliding scale   SubCutaneous three times a day before meals  insulin lispro Injectable (ADMELOG) 4 Unit(s) SubCutaneous three times a day before meals        
HPI: This is a 43 year old male with a past medical history of DM, mood disorder, aggression depression, anxiety and alcohol use disorder(denies active use), who presented to the hospital for worsening SI. Admitted to Inpatient Psychiatry. Being followed by Medicine Consult for uncontrolled type I diabetes on insulin.    Interval Hx: Glucose remains suboptimally controlled, fasting glucose 280 this AM.     ROS: Multi system review is comprehensively negative x systems     Exam:  Vitals WNL, stable.  Gen: Comfortable appearing  HEENT: NCAT PERRL EOMI MMM clear oropharynx  Neck: Supple, no JVD, no LAD  Chest: Normal respiratory effort, lungs CTA B/L  CVS: s1 s2, RRR  Abd: +BS, soft NT ND   Ext: No edema or calf tenderness, normal cap refill  Skin: Warm, dry  Mood: Calm, pleasant  Neuro: A+OX3, no gross deficits    Labs:  Glucose 250s-280s    Meds:  MEDICATIONS  (STANDING):  buPROPion XL (24-Hour) 150 milliGRAM(s) Oral <User Schedule>  insulin glargine Injectable (LANTUS) 22 Unit(s) SubCutaneous at bedtime  insulin lispro (ADMELOG) corrective regimen sliding scale   SubCutaneous at bedtime  insulin lispro (ADMELOG) corrective regimen sliding scale   SubCutaneous three times a day before meals  insulin lispro Injectable (ADMELOG) 9 Unit(s) SubCutaneous three times a day before meals    MEDICATIONS  (PRN):  dextrose Oral Gel 15 Gram(s) Oral once PRN Blood Glucose LESS THAN 70 milliGRAM(s)/deciliter  diphenhydrAMINE 50 milliGRAM(s) Oral every 6 hours PRN Extrapyramidal prophylaxis  diphenhydrAMINE Injectable 50 milliGRAM(s) IntraMuscular Once PRN Extrapyramidal prophylaxis  haloperidol     Tablet 5 milliGRAM(s) Oral every 6 hours PRN moderate psychotic agitation  haloperidol    Injectable 5 milliGRAM(s) IntraMuscular Once PRN severe psychotic agitation  
HPI: This is a 43 year old male with a past medical history of DM, mood disorder, aggression depression, anxiety and alcohol use disorder(denies active use), who presented to the hospital for worsening SI. Admitted to Inpatient Psychiatry. Being followed by Medicine Consult for uncontrolled type I diabetes on insulin.    Interval Hx: With recent adjustments to insulin regimen, glycemic control now appears to have improved. Fasting glucose 101 this AM. Pre-lunch 189.     ROS: Multi system review is comprehensively negative x systems     Exam:  Vitals WNL, stable.  Gen: Comfortable appearing  HEENT: NCAT PERRL EOMI MMM clear oropharynx  Neck: Supple, no JVD, no LAD  Chest: Normal respiratory effort, lungs CTA B/L  CVS: s1 s2, RRR  Abd: +BS, soft NT ND   Ext: No edema or calf tenderness, normal cap refill  Skin: Warm, dry  Mood: Calm, pleasant  Neuro: A+OX3, no gross deficits    Labs:  Glucose 101-189 today    Meds:  MEDICATIONS  (STANDING):  buPROPion XL (24-Hour) 150 milliGRAM(s) Oral <User Schedule>  insulin glargine Injectable (LANTUS) 22 Unit(s) SubCutaneous at bedtime  insulin lispro (ADMELOG) corrective regimen sliding scale   SubCutaneous at bedtime  insulin lispro (ADMELOG) corrective regimen sliding scale   SubCutaneous three times a day before meals  insulin lispro Injectable (ADMELOG) 9 Unit(s) SubCutaneous three times a day before meals    MEDICATIONS  (PRN):  dextrose Oral Gel 15 Gram(s) Oral once PRN Blood Glucose LESS THAN 70 milliGRAM(s)/deciliter  diphenhydrAMINE 50 milliGRAM(s) Oral every 6 hours PRN Extrapyramidal prophylaxis  diphenhydrAMINE Injectable 50 milliGRAM(s) IntraMuscular Once PRN Extrapyramidal prophylaxis  haloperidol     Tablet 5 milliGRAM(s) Oral every 6 hours PRN moderate psychotic agitation  haloperidol    Injectable 5 milliGRAM(s) IntraMuscular Once PRN severe psychotic agitation

## 2024-01-30 NOTE — BH INPATIENT PSYCHIATRY PROGRESS NOTE - NSBHTIMEACTIVITIESPERFORMED_PSY_A_CORE
1. interviewed the pt to assess current mental status  2. reviewed medications    3. reviewed lab results   4. conferred with nursing concerning behavior on the unit , and CSW   5.spoke with his wife

## 2024-01-30 NOTE — CHART NOTE - NSCHARTNOTEFT_GEN_A_CORE
SW spoke with pt's wife. Family meeting scheduled for 3:15PM on Thursday. Wife with continued concerns about pt returning home. Per wife, she YOVANI spoke with pt's wife. Family meeting scheduled for 3:15PM on Thursday. Wife with continued concerns about pt returning home. Per wife, she has court tomorrow morning. Wife to provide YOVANI with an update following court. Per wife, pt needs to call the court to let them know he will not be able to attend. YOVANI also discussed that pt can request a letter be sent to court. YOVANI agreeable SW spoke with pt's wife. Family meeting scheduled for 3:15PM on Thursday. Wife with continued concerns about pt returning home. Per wife, she has court tomorrow morning. Wife to provide SW with an update following court. Per wife, pt needs to call the court to let them know he will not be able to attend. SW also discussed that pt can request a letter be sent to court. SW agreeable to follow up with pt. Wife also indicated questions about pt's medication. SW deferred to psychiatrist regarding all questions about medication. SW offered to transfer wife to nurses station to request to speak with psychiatrist. Wife declined indicating that she left a message for doctor this morning.

## 2024-01-30 NOTE — BH INPATIENT PSYCHIATRY PROGRESS NOTE - NSBHASSESSSUMMFT_PSY_ALL_CORE
pt with denial of any depression or anxiety , pt in behavioral control , still minimizing symptoms. , wife mentions episodes of anger   mitigating pt willing to continue with medication  pt with denial of any depression or anxiety , pt in behavioral control , still minimizing symptoms. , wife mentions episodes of anger   mitigating pt willing to continue with medication  protective Pt with family  and their support   chronic pt with marital problems

## 2024-01-30 NOTE — BH INPATIENT PSYCHIATRY PROGRESS NOTE - CURRENT MEDICATION
MEDICATIONS  (STANDING):  buPROPion XL (24-Hour) 150 milliGRAM(s) Oral <User Schedule>  dextrose 5%. 1000 milliLiter(s) (50 mL/Hr) IV Continuous <Continuous>  dextrose 5%. 1000 milliLiter(s) (100 mL/Hr) IV Continuous <Continuous>  dextrose 50% Injectable 25 Gram(s) IV Push once  dextrose 50% Injectable 12.5 Gram(s) IV Push once  dextrose 50% Injectable 25 Gram(s) IV Push once  glucagon  Injectable 1 milliGRAM(s) IntraMuscular once  guanFACINE. 0.5 milliGRAM(s) Oral <User Schedule>  insulin glargine Injectable (LANTUS) 22 Unit(s) SubCutaneous at bedtime  insulin lispro (ADMELOG) corrective regimen sliding scale   SubCutaneous at bedtime  insulin lispro (ADMELOG) corrective regimen sliding scale   SubCutaneous three times a day before meals  insulin lispro Injectable (ADMELOG) 9 Unit(s) SubCutaneous three times a day before meals    MEDICATIONS  (PRN):  dextrose Oral Gel 15 Gram(s) Oral once PRN Blood Glucose LESS THAN 70 milliGRAM(s)/deciliter  diphenhydrAMINE 50 milliGRAM(s) Oral every 6 hours PRN Extrapyramidal prophylaxis  diphenhydrAMINE Injectable 50 milliGRAM(s) IntraMuscular Once PRN Extrapyramidal prophylaxis  haloperidol     Tablet 5 milliGRAM(s) Oral every 6 hours PRN moderate psychotic agitation  haloperidol    Injectable 5 milliGRAM(s) IntraMuscular Once PRN severe psychotic agitation   MEDICATIONS  (STANDING):  buPROPion XL (24-Hour) 150 milliGRAM(s) Oral <User Schedule>  dextrose 5%. 1000 milliLiter(s) (50 mL/Hr) IV Continuous <Continuous>  dextrose 5%. 1000 milliLiter(s) (100 mL/Hr) IV Continuous <Continuous>  dextrose 50% Injectable 25 Gram(s) IV Push once  dextrose 50% Injectable 12.5 Gram(s) IV Push once  dextrose 50% Injectable 25 Gram(s) IV Push once  glucagon  Injectable 1 milliGRAM(s) IntraMuscular once  guanFACINE. 0.5 milliGRAM(s) Oral <User Schedule>  insulin glargine Injectable (LANTUS) 22 Unit(s) SubCutaneous at bedtime  insulin lispro (ADMELOG) corrective regimen sliding scale   SubCutaneous at bedtime  insulin lispro (ADMELOG) corrective regimen sliding scale   SubCutaneous three times a day before meals  insulin lispro Injectable (ADMELOG) 9 Unit(s) SubCutaneous three times a day before meals  permethrin 5% Cream 1 Application(s) Topical once    MEDICATIONS  (PRN):  dextrose Oral Gel 15 Gram(s) Oral once PRN Blood Glucose LESS THAN 70 milliGRAM(s)/deciliter  diphenhydrAMINE 50 milliGRAM(s) Oral every 6 hours PRN Extrapyramidal prophylaxis  diphenhydrAMINE Injectable 50 milliGRAM(s) IntraMuscular Once PRN Extrapyramidal prophylaxis  haloperidol     Tablet 5 milliGRAM(s) Oral every 6 hours PRN moderate psychotic agitation  haloperidol    Injectable 5 milliGRAM(s) IntraMuscular Once PRN severe psychotic agitation

## 2024-01-30 NOTE — BH INPATIENT PSYCHIATRY PROGRESS NOTE - NSBHFUPINTERVALHXFT_PSY_A_CORE
Spoke with the wife with pt consent and she indicated pt with "anger issues and "intermittent explosive disorder .  She is aware that pt has not demonstrated any anger outburst while in the hospital . She is also seeking order of protection on wed in court and most likely seeking a stay away order.  Lalo offer with the CSW added support of PHP . Will have family meeting on thursday with the wife, pt and CSW  Spoke with the wife with pt consent and she indicated pt with "anger issues and "intermittent explosive disorder .  She is aware that pt has not demonstrated any anger outburst while in the hospital . She is also seeking order of protection on wed in court and most likely seeking a stay away order.  Lalo offer with the CSW added support of PHP . Will have family meeting on thursday with the wife, pt and CSW   Interval History  1/29/2024 Read hospitalist note  and appreciated input towards pt treatment. Pt is aware of the court will be deciding the extent of the order of protection on Wednesday of this week. It is possible that it would be a stay away as the wife has mentioned previously that she is uncomfortable with the pt returning to the home where she and the children and are residing . As part of a safe discharge planning for the pt will need to know plan of where the pt will be staying in order to place aftercare treatment , and which pharmacy to direct his prescriptions. At this time pt continues with minimization of symptoms and still indicates that he is   with a euthymic mood and is not appearing to be under any duress. He denies any suicidal ideation intent or plan   1/28/2024 Pt not appearing to be in any distress  Pt with interaction with staff and peers.  Pt is aware of wife request that he stay with her mother. Pt willing to have a family meeting    1/27/2024 Pt continues with the Wellbutrin and claiming to be feeling well.  Pt with continued superficial cooperation and minimizing issues.  Ot is aware that his wife has requested of him to live else where and not to return home directly .  CPS meeting is pending and will need family meeting to include the pt , wife and CSW and myself. prior tp aftercare planning   1'/26/2024 Pt with plans to apply for disability .  Pt with continued decreasing depression and reports that sleep is still good . Pt remains superficial and denies any worsening of depression or any other concerns. Pt continues with minimizing symptoms and denies any suicidal or homicidal ideation intent or plan   1/25/2024 Pt claiming he is sleeping better and is less depressed in mood .  Pt denies any hallucination . He denies any suicidal or homicidal ideation .  Pt in behavioral control .  Pt started on th Wellbutrin and made the above request.  Currently will remain at 150mg of the Wellbutrin and encourage pt to attend groups

## 2024-01-31 LAB
GLUCOSE BLDC GLUCOMTR-MCNC: 104 MG/DL — HIGH (ref 70–99)
GLUCOSE BLDC GLUCOMTR-MCNC: 118 MG/DL — HIGH (ref 70–99)
GLUCOSE BLDC GLUCOMTR-MCNC: 154 MG/DL — HIGH (ref 70–99)
GLUCOSE BLDC GLUCOMTR-MCNC: 162 MG/DL — HIGH (ref 70–99)
GLUCOSE BLDC GLUCOMTR-MCNC: 300 MG/DL — HIGH (ref 70–99)

## 2024-01-31 PROCEDURE — 99232 SBSQ HOSP IP/OBS MODERATE 35: CPT

## 2024-01-31 RX ORDER — GUANFACINE 3 MG/1
0.5 TABLET, EXTENDED RELEASE ORAL
Refills: 0 | Status: DISCONTINUED | OUTPATIENT
Start: 2024-01-31 | End: 2024-02-01

## 2024-01-31 RX ORDER — PERMETHRIN CREAM 5% W/W 50 MG/G
1 CREAM TOPICAL ONCE
Refills: 0 | Status: COMPLETED | OUTPATIENT
Start: 2024-01-31 | End: 2024-01-31

## 2024-01-31 RX ADMIN — Medication 1: at 17:36

## 2024-01-31 RX ADMIN — Medication 9 UNIT(S): at 17:37

## 2024-01-31 RX ADMIN — Medication 9 UNIT(S): at 08:37

## 2024-01-31 RX ADMIN — BUPROPION HYDROCHLORIDE 150 MILLIGRAM(S): 150 TABLET, EXTENDED RELEASE ORAL at 17:50

## 2024-01-31 RX ADMIN — PERMETHRIN CREAM 5% W/W 1 APPLICATION(S): 50 CREAM TOPICAL at 19:39

## 2024-01-31 RX ADMIN — Medication 50 MILLIGRAM(S): at 21:42

## 2024-01-31 RX ADMIN — GUANFACINE 0.5 MILLIGRAM(S): 3 TABLET, EXTENDED RELEASE ORAL at 21:42

## 2024-01-31 RX ADMIN — INSULIN GLARGINE 22 UNIT(S): 100 INJECTION, SOLUTION SUBCUTANEOUS at 22:18

## 2024-01-31 RX ADMIN — Medication 1: at 08:37

## 2024-01-31 RX ADMIN — HALOPERIDOL DECANOATE 5 MILLIGRAM(S): 100 INJECTION INTRAMUSCULAR at 21:42

## 2024-01-31 RX ADMIN — BUPROPION HYDROCHLORIDE 150 MILLIGRAM(S): 150 TABLET, EXTENDED RELEASE ORAL at 08:48

## 2024-01-31 NOTE — BH DISCHARGE NOTE NURSING/SOCIAL WORK/PSYCH REHAB - NSDPDISTO_PSY_ALL_CORE
Pt to stay with his mother and father in-law upon discharge:   [address] [place] [address]  [phone]/Home

## 2024-01-31 NOTE — BH INPATIENT PSYCHIATRY PROGRESS NOTE - CURRENT MEDICATION
MEDICATIONS  (STANDING):  buPROPion XL (24-Hour) 150 milliGRAM(s) Oral <User Schedule>  dextrose 5%. 1000 milliLiter(s) (50 mL/Hr) IV Continuous <Continuous>  dextrose 5%. 1000 milliLiter(s) (100 mL/Hr) IV Continuous <Continuous>  dextrose 50% Injectable 25 Gram(s) IV Push once  dextrose 50% Injectable 25 Gram(s) IV Push once  dextrose 50% Injectable 12.5 Gram(s) IV Push once  glucagon  Injectable 1 milliGRAM(s) IntraMuscular once  guanFACINE. 0.5 milliGRAM(s) Oral <User Schedule>  insulin glargine Injectable (LANTUS) 22 Unit(s) SubCutaneous at bedtime  insulin lispro (ADMELOG) corrective regimen sliding scale   SubCutaneous three times a day before meals  insulin lispro (ADMELOG) corrective regimen sliding scale   SubCutaneous at bedtime  insulin lispro Injectable (ADMELOG) 9 Unit(s) SubCutaneous three times a day before meals  permethrin 5% Cream 1 Application(s) Topical once    MEDICATIONS  (PRN):  dextrose Oral Gel 15 Gram(s) Oral once PRN Blood Glucose LESS THAN 70 milliGRAM(s)/deciliter  diphenhydrAMINE 50 milliGRAM(s) Oral every 6 hours PRN Extrapyramidal prophylaxis  diphenhydrAMINE Injectable 50 milliGRAM(s) IntraMuscular Once PRN Extrapyramidal prophylaxis  haloperidol     Tablet 5 milliGRAM(s) Oral every 6 hours PRN moderate psychotic agitation  haloperidol    Injectable 5 milliGRAM(s) IntraMuscular Once PRN severe psychotic agitation

## 2024-01-31 NOTE — BH DISCHARGE NOTE NURSING/SOCIAL WORK/PSYCH REHAB - NSDCPLANREVIEWED_PSY_ALL_CORE
Discharge plan reviewed with patient and patient agreeable to receive a copy of the nursing/social work/MD discharge through the patient portal. Discharge plan reviewed with patient and patient agreeable to receive a copy of the nursing/social work/MD discharge through the patient portal./Yes

## 2024-01-31 NOTE — CHART NOTE - NSCHARTNOTEFT_GEN_A_CORE
met with patient to review discharge plan and ask if he would be more comfortable first attending PHP at [facility] before [facility].  Pt states he already did PHP and doesn't feel it did too much for him.  He states [facility] is a better fit at this time for his schedule.  He would like to continue with the plan as is and be discharged Friday with aftercare at [facility].

## 2024-01-31 NOTE — BH INPATIENT PSYCHIATRY PROGRESS NOTE - CURRENT MEDICATION
MEDICATIONS  (STANDING):  buPROPion XL (24-Hour) 150 milliGRAM(s) Oral <User Schedule>  dextrose 5%. 1000 milliLiter(s) (50 mL/Hr) IV Continuous <Continuous>  dextrose 5%. 1000 milliLiter(s) (100 mL/Hr) IV Continuous <Continuous>  dextrose 50% Injectable 25 Gram(s) IV Push once  dextrose 50% Injectable 12.5 Gram(s) IV Push once  dextrose 50% Injectable 25 Gram(s) IV Push once  glucagon  Injectable 1 milliGRAM(s) IntraMuscular once  guanFACINE. 0.5 milliGRAM(s) Oral <User Schedule>  insulin glargine Injectable (LANTUS) 22 Unit(s) SubCutaneous at bedtime  insulin lispro (ADMELOG) corrective regimen sliding scale   SubCutaneous at bedtime  insulin lispro (ADMELOG) corrective regimen sliding scale   SubCutaneous three times a day before meals  insulin lispro Injectable (ADMELOG) 9 Unit(s) SubCutaneous three times a day before meals    MEDICATIONS  (PRN):  dextrose Oral Gel 15 Gram(s) Oral once PRN Blood Glucose LESS THAN 70 milliGRAM(s)/deciliter  diphenhydrAMINE 50 milliGRAM(s) Oral every 6 hours PRN Extrapyramidal prophylaxis  diphenhydrAMINE Injectable 50 milliGRAM(s) IntraMuscular Once PRN Extrapyramidal prophylaxis  haloperidol     Tablet 5 milliGRAM(s) Oral every 6 hours PRN moderate psychotic agitation  haloperidol    Injectable 5 milliGRAM(s) IntraMuscular Once PRN severe psychotic agitation

## 2024-01-31 NOTE — BH DISCHARGE NOTE NURSING/SOCIAL WORK/PSYCH REHAB - PATIENT PORTAL LINK FT
You can access the FollowMyHealth Patient Portal offered by [facility] by registering at the following website: [email or web address]. By joining [employer]’s FollowMyHealth portal, you will also be able to view your health information using other applications (apps) compatible with our system.

## 2024-01-31 NOTE — BH TREATMENT PLAN - NSTXCAREGIVERPARTICIPATE_PSY_P_CORE
No, patient unwilling to involve family/caregiver
Family/Caregiver participated in identification of needs/problems/goals for treatment/Family/Caregiver participated in defining interventions/Family/Caregiver participated in development of after care plan

## 2024-01-31 NOTE — BH TREATMENT PLAN - NSDCCRITERIA_PSY_ALL_CORE
pt with euthymic mood , denies any suicidal ideation intent or plan 
pt with euthymic mood , denies any suicidal ideation intent or plan

## 2024-01-31 NOTE — BH TREATMENT PLAN - NSTXCOPEINTERPR_PSY_ALL_CORE
Coping skills need improvement.  Patient will be discharged today.
Coping skills need improvement.  Patient will be discharged today.

## 2024-01-31 NOTE — BH INPATIENT PSYCHIATRY PROGRESS NOTE - NSBHASSESSSUMMFT_PSY_ALL_CORE
pt denies any suicidal ideation intent or plan   mitigating pt with medication   protective pt with a place to live   chronic pt with marital issues

## 2024-01-31 NOTE — BH TREATMENT PLAN - NSTXDCOTHRGOAL_PSY_ALL_CORE
Patient will have an appropriate plan for discharge to include safe placement upon d/c and an appointment for continuing in the appropriate level of psychiatric outpatient treatment within 5 business days of d/c from . Pt will have an evaluation of alcohol and substance abuse treatment and will have an appoiintment in place to address those issues as well if indicated.
Patient will have an appropriate plan for discharge to include safe placement upon d/c and an appointment for continuing in the appropriate level of psychiatric outpatient treatment within 5 business days of d/c from . Pt will have an evaluation of alcohol and substance abuse treatment and will have an appoiintment in place to address those issues as well if indicated.

## 2024-01-31 NOTE — BH TREATMENT PLAN - NSTXDEPRESINTERRN_PSY_ALL_CORE
Establish trust/therapeutic rapport to encourage ventilation of feelings.  Provide emotional support.  Assess mood/suicidality Q shift, document and report changes.  Encourage medication compliance, provide education, and monitor effects.  Encourage participation in unit activities with emphasis on coping/stress management.
Establish trust/therapeutic rapport to encourage ventilation of feelings.  Provide emotional support.  Assess mood/suicidality Q shift, document and report changes.  Encourage medication compliance, provide education, and monitor effects.  Encourage participation in unit activities with emphasis on coping/stress management.

## 2024-01-31 NOTE — BH DISCHARGE NOTE NURSING/SOCIAL WORK/PSYCH REHAB - NSCDUDCCRISIS_PSY_A_CORE
.  [facility] – Crisis Care for Children, Adults and Families  [address] [place] [address]  Mobile Crisis Hotline – (219) 247-3953/.National Suicide Prevention Lifeline 6 (740) 345-3323/.  [employer]  1 (307) LIFENET (693-3304)/.  [facility]  ([phone] 115-9056/.  [facility] Response Crisis Hotline  (385) 887-5127  24 hour telephone crisis intervention and suicide prevention hotline concerned with all mental health issues/Other.../988 Suicide and Crisis Lifeline

## 2024-01-31 NOTE — BH DISCHARGE NOTE NURSING/SOCIAL WORK/PSYCH REHAB - NSDCADDINFO1FT_PSY_ALL_CORE
Patient to address any issues related to substance abuse in treatment with [facility].     Please contact Dr. Cecelia Foster for medication or treatment questions, lab results or any other concerns at [phone]. Handoff provided to your outpatient provider, [facility].  Patient has agreed to receive a copy of their Nursing/Social Work/MD discharge note in the patient portal.  If needed, please contact [facility], 5N, 24/7 days a week and speak with Gissel Frausto RN Nurse manager or any N staff member.  Please return to the ED if symptoms worsen or return. You have an appointment on 2/9/24 in person at [facility] at 3PM with Kaylen PEDRAZA LMSW for an intake.     Patient to address any issues related to substance abuse in treatment with [facility].     Please contact Dr. Cecelia Foster for medication or treatment questions, lab results or any other concerns at [phone]. Handoff provided to your outpatient provider, [facility].  Patient has agreed to receive a copy of their Nursing/Social Work/MD discharge note in the patient portal.  If needed, please contact [facility], 5N, 24/7 days a week and speak with Gissel Frausto RN Nurse manager or any 5N staff member.  Please return to the ED if symptoms worsen or return.

## 2024-01-31 NOTE — BH INPATIENT PSYCHIATRY PROGRESS NOTE - NSBHFUPINTERVALHXFT_PSY_A_CORE
1/31/2024 Pt continues with behavior control . Pt with some elevated BP , will add Tenex to help control BP , to help also to modulate behavior.    Pt gives  hx of  being sensitive to side effect of serotonin medication and not wanting potential sexual side effects Did medication check with different mood stabilizing options of Depakote which according to med  to potentially increase the Wellbutrin level. Discussed lithium but pt dand wife dismiss any bipolar manic symptoms and not liking side effects.   1/30/2024 Spoke with the wife with pt consent and she indicated pt with "anger issues and "intermittent explosive disorder .  She is aware that pt has not demonstrated any anger outburst while in the hospital . She is also seeking order of protection on wed in court and most likely seeking a stay away order.  Lalo offer with the CSW added support of PHP . Will have family meeting on thursday with the wife, pt and CSW   1/29/2024 Read hospitalist note  and appreciated input towards pt treatment. Pt is aware of the court will be deciding the extent of the order of protection on Wednesday of this week. It is possible that it would be a stay away as the wife has mentioned previously that she is uncomfortable with the pt returning to the home where she and the children and are residing . As part of a safe discharge planning for the pt will need to know plan of where the pt will be staying in order to place aftercare treatment , and which pharmacy to direct his prescriptions. At this time pt continues with minimization of symptoms and still indicates that he is   with a euthymic mood and is not appearing to be under any duress. He denies any suicidal ideation intent or plan   1/28/2024 Pt not appearing to be in any distress  Pt with interaction with staff and peers.  Pt is aware of wife request that he stay with her mother. Pt willing to have a family meeting    1/27/2024 Pt continues with the Wellbutrin and claiming to be feeling well.  Pt with continued superficial cooperation and minimizing issues.  Ot is aware that his wife has requested of him to live else where and not to return home directly .  CPS meeting is pending and will need family meeting to include the pt , wife and CSW and myself. prior tp aftercare planning   1'/26/2024 Pt with plans to apply for disability .  Pt with continued decreasing depression and reports that sleep is still good . Pt remains superficial and denies any worsening of depression or any other concerns. Pt continues with minimizing symptoms and denies any suicidal or homicidal ideation intent or plan   1/25/2024 Pt claiming he is sleeping better and is less depressed in mood .  Pt denies any hallucination . He denies any suicidal or homicidal ideation .  Pt in behavioral control .  Pt started on th Wellbutrin and made the above request.  Currently will remain at 150mg of the Wellbutrin and encourage pt to attend groups

## 2024-01-31 NOTE — BH TREATMENT PLAN - NSTXDCOTHRINTERSW_PSY_ALL_CORE
After speaking with pt's wife, YOVANI spoke with pt. Pt updated on family meeting scheduled for Thursday at 3:15 with his wife. Pt agreeable. YOVANI discussed with pt that wife indicated pt with court tomorrow and that pt needs to call the court to let him know he is not able to attend. YOVANI also discussed that a letter can be sent on pt's behalf. Pt agreeable to call court and for letter to be sent. Pt signed consent for letter to be sent. Unit  sent letter to appropriate court and provided pt with phone number to call for follow up. YOVANI discussed case with psychiatrist. Psychiatrist agreeable with plan. Per conversation with psychiatrist, anticipated discharge will be Friday. YOVANI faxed referral to [facility]. Further DC planning pendng FSL response and family meeting.
After speaking with pt's wife, YOVANI spoke with pt. Pt updated on family meeting scheduled for Thursday at 3:15 with his wife. Pt agreeable. YOVANI discussed with pt that wife indicated pt with court tomorrow and that pt needs to call the court to let him know he is not able to attend. YOVANI also discussed that a letter can be sent on pt's behalf. Pt agreeable to call court and for letter to be sent. Pt signed consent for letter to be sent. Unit  sent letter to appropriate court and provided pt with phone number to call for follow up. YOVANI discussed case with psychiatrist. Psychiatrist agreeable with plan. Per conversation with psychiatrist, anticipated discharge will be Friday. YOVANI faxed referral to [facility]. Further DC planning pendng FSL response and family meeting.

## 2024-02-01 LAB
GLUCOSE BLDC GLUCOMTR-MCNC: 165 MG/DL — HIGH (ref 70–99)
GLUCOSE BLDC GLUCOMTR-MCNC: 183 MG/DL — HIGH (ref 70–99)
GLUCOSE BLDC GLUCOMTR-MCNC: 193 MG/DL — HIGH (ref 70–99)
GLUCOSE BLDC GLUCOMTR-MCNC: 211 MG/DL — HIGH (ref 70–99)

## 2024-02-01 PROCEDURE — 99233 SBSQ HOSP IP/OBS HIGH 50: CPT

## 2024-02-01 RX ORDER — NALTREXONE HYDROCHLORIDE 50 MG/1
25 TABLET, FILM COATED ORAL DAILY
Refills: 0 | Status: DISCONTINUED | OUTPATIENT
Start: 2024-02-01 | End: 2024-02-02

## 2024-02-01 RX ORDER — GUANFACINE 3 MG/1
1 TABLET, EXTENDED RELEASE ORAL
Refills: 0 | Status: DISCONTINUED | OUTPATIENT
Start: 2024-02-01 | End: 2024-02-02

## 2024-02-01 RX ADMIN — Medication 9 UNIT(S): at 06:37

## 2024-02-01 RX ADMIN — HALOPERIDOL DECANOATE 5 MILLIGRAM(S): 100 INJECTION INTRAMUSCULAR at 20:36

## 2024-02-01 RX ADMIN — Medication 1: at 06:38

## 2024-02-01 RX ADMIN — BUPROPION HYDROCHLORIDE 150 MILLIGRAM(S): 150 TABLET, EXTENDED RELEASE ORAL at 09:44

## 2024-02-01 RX ADMIN — Medication 1: at 17:51

## 2024-02-01 RX ADMIN — BUPROPION HYDROCHLORIDE 150 MILLIGRAM(S): 150 TABLET, EXTENDED RELEASE ORAL at 17:50

## 2024-02-01 RX ADMIN — Medication 50 MILLIGRAM(S): at 20:36

## 2024-02-01 RX ADMIN — INSULIN GLARGINE 22 UNIT(S): 100 INJECTION, SOLUTION SUBCUTANEOUS at 20:42

## 2024-02-01 RX ADMIN — Medication 9 UNIT(S): at 12:48

## 2024-02-01 RX ADMIN — GUANFACINE 1 MILLIGRAM(S): 3 TABLET, EXTENDED RELEASE ORAL at 17:50

## 2024-02-01 RX ADMIN — GUANFACINE 1 MILLIGRAM(S): 3 TABLET, EXTENDED RELEASE ORAL at 12:49

## 2024-02-01 RX ADMIN — Medication 9 UNIT(S): at 17:51

## 2024-02-01 RX ADMIN — Medication 1: at 12:48

## 2024-02-01 RX ADMIN — GUANFACINE 0.5 MILLIGRAM(S): 3 TABLET, EXTENDED RELEASE ORAL at 09:44

## 2024-02-01 NOTE — BH INPATIENT PSYCHIATRY PROGRESS NOTE - CURRENT MEDICATION
MEDICATIONS  (STANDING):  buPROPion XL (24-Hour) 150 milliGRAM(s) Oral <User Schedule>  dextrose 5%. 1000 milliLiter(s) (100 mL/Hr) IV Continuous <Continuous>  dextrose 5%. 1000 milliLiter(s) (50 mL/Hr) IV Continuous <Continuous>  dextrose 50% Injectable 25 Gram(s) IV Push once  dextrose 50% Injectable 25 Gram(s) IV Push once  dextrose 50% Injectable 12.5 Gram(s) IV Push once  glucagon  Injectable 1 milliGRAM(s) IntraMuscular once  guanFACINE. 1 milliGRAM(s) Oral <User Schedule>  insulin glargine Injectable (LANTUS) 22 Unit(s) SubCutaneous at bedtime  insulin lispro (ADMELOG) corrective regimen sliding scale   SubCutaneous at bedtime  insulin lispro (ADMELOG) corrective regimen sliding scale   SubCutaneous three times a day before meals  insulin lispro Injectable (ADMELOG) 9 Unit(s) SubCutaneous three times a day before meals  naltrexone 25 milliGRAM(s) Oral daily    MEDICATIONS  (PRN):  dextrose Oral Gel 15 Gram(s) Oral once PRN Blood Glucose LESS THAN 70 milliGRAM(s)/deciliter  diphenhydrAMINE 50 milliGRAM(s) Oral every 6 hours PRN Extrapyramidal prophylaxis  diphenhydrAMINE Injectable 50 milliGRAM(s) IntraMuscular Once PRN Extrapyramidal prophylaxis  haloperidol     Tablet 5 milliGRAM(s) Oral every 6 hours PRN moderate psychotic agitation  haloperidol    Injectable 5 milliGRAM(s) IntraMuscular Once PRN severe psychotic agitation

## 2024-02-01 NOTE — BH INPATIENT PSYCHIATRY PROGRESS NOTE - NSBHMSEKNOWHOW_PSY_ALL_CORE
Current Events/Educational attainment/Vocabulary

## 2024-02-01 NOTE — BH INPATIENT PSYCHIATRY PROGRESS NOTE - NSTXDEPRESINTERMD_PSY_ALL_CORE
pt continues with wellbutrin 

## 2024-02-01 NOTE — BH INPATIENT PSYCHIATRY PROGRESS NOTE - NSBHMSEGAIT_PSY_A_CORE
Normal gait / station
Unable to assess

## 2024-02-01 NOTE — BH INPATIENT PSYCHIATRY PROGRESS NOTE - NSTXCOPEDATEEST_PSY_ALL_CORE
24-Jan-2024
29-Jan-2024
29-Jan-2024
24-Jan-2024
29-Jan-2024
24-Jan-2024
29-Jan-2024
24-Jan-2024
29-Jan-2024

## 2024-02-01 NOTE — BH INPATIENT PSYCHIATRY PROGRESS NOTE - NSTXDCOTHRDATETRGT_PSY_ALL_CORE
02-Feb-2024
31-Jan-2024
02-Feb-2024

## 2024-02-01 NOTE — CHART NOTE - NSCHARTNOTEFT_GEN_A_CORE
Yesterday, SW received voicemail from pt's wife indicating pt's court case was adjourned, as pt wasn't present. Per wife's voicemail, refrain from order remains in place at this time.

## 2024-02-01 NOTE — BH INPATIENT PSYCHIATRY PROGRESS NOTE - NSTXDCOTHRDATEEST_PSY_ALL_CORE
30-Jan-2024
30-Jan-2024
26-Jan-2024
26-Jan-2024
24-Jan-2024
26-Jan-2024
30-Jan-2024
26-Jan-2024
30-Jan-2024

## 2024-02-01 NOTE — BH INPATIENT PSYCHIATRY PROGRESS NOTE - NSBHFUPINTERVALHXFT_PSY_A_CORE
2/1/2024 Spoke with the wife reviewed medications that the pt is on.  She is wanting the pt to be on revia which he was in the past on for alcohol use .  Pt agreeing and is willing to continue with the tenex and the Wellbutrin.   Nursing noted that the pt with periods of verbal baiting towards a male peer who is known to be antagonistic.  No escalation to any behavioral outburst.  Pt deniues any suicidal or homicidal ideation intent or plan     1/31/2024 Pt continues with behavior control . Pt with some elevated BP , will add Tenex to help control BP , to help also to modulate behavior.    Pt gives  hx of  being sensitive to side effect of serotonin medication and not wanting potential sexual side effects Did medication check with different mood stabilizing options of Depakote which according to med  to potentially increase the Wellbutrin level. Discussed lithium but pt dand wife dismiss any bipolar manic symptoms and not liking side effects.   1/30/2024 Spoke with the wife with pt consent and she indicated pt with "anger issues and "intermittent explosive disorder .  She is aware that pt has not demonstrated any anger outburst while in the hospital . She is also seeking order of protection on wed in court and most likely seeking a stay away order.  Lalo offer with the CSW added support of PHP . Will have family meeting on thursday with the wife, pt and CSW   1/29/2024 Read hospitalist note  and appreciated input towards pt treatment. Pt is aware of the court will be deciding the extent of the order of protection on Wednesday of this week. It is possible that it would be a stay away as the wife has mentioned previously that she is uncomfortable with the pt returning to the home where she and the children and are residing . As part of a safe discharge planning for the pt will need to know plan of where the pt will be staying in order to place aftercare treatment , and which pharmacy to direct his prescriptions. At this time pt continues with minimization of symptoms and still indicates that he is   with a euthymic mood and is not appearing to be under any duress. He denies any suicidal ideation intent or plan

## 2024-02-01 NOTE — BH INPATIENT PSYCHIATRY PROGRESS NOTE - NSBHCHARTREVIEWVS_PSY_A_CORE FT
Vital Signs Last 24 Hrs  T(C): 36.8 (01-26-24 @ 06:56), Max: 36.8 (01-26-24 @ 06:56)  T(F): 98.3 (01-26-24 @ 06:56), Max: 98.3 (01-26-24 @ 06:56)  HR: --  BP: --  BP(mean): --  RR: 16 (01-26-24 @ 06:56) (16 - 16)  SpO2: 100% (01-26-24 @ 06:56) (100% - 100%)    Orthostatic VS  01-26-24 @ 06:56  Lying BP: --/-- HR: --  Sitting BP: 133/75 HR: 79  Standing BP: 131/96 HR: 90  Site: upper right arm  Mode: electronic  Orthostatic VS  01-25-24 @ 07:14  Lying BP: --/-- HR: --  Sitting BP: 135/79 HR: 68  Standing BP: 140/77 HR: 75  Site: upper right arm  Mode: electronic  
Vital Signs Last 24 Hrs  T(C): 36.7 (02-01-24 @ 07:56), Max: 36.7 (02-01-24 @ 07:56)  T(F): 98.1 (02-01-24 @ 07:56), Max: 98.1 (02-01-24 @ 07:56)  HR: --  BP: --  BP(mean): --  RR: 18 (02-01-24 @ 07:56) (18 - 18)  SpO2: 100% (02-01-24 @ 07:56) (100% - 100%)    Orthostatic VS  02-01-24 @ 07:56  Lying BP: --/-- HR: --  Sitting BP: 131/85 HR: 94  Standing BP: 117/83 HR: 97  Site: upper right arm  Mode: electronic  Orthostatic VS  01-31-24 @ 10:12  Lying BP: --/-- HR: --  Sitting BP: 132/79 HR: 93  Standing BP: 134/84 HR: 95  Site: upper right arm  Mode: electronic  
Vital Signs Last 24 Hrs  T(C): 36.4 (01-25-24 @ 07:14), Max: 36.4 (01-25-24 @ 07:14)  T(F): 97.6 (01-25-24 @ 07:14), Max: 97.6 (01-25-24 @ 07:14)  HR: --  BP: --  BP(mean): --  RR: 18 (01-25-24 @ 07:14) (18 - 18)  SpO2: 100% (01-25-24 @ 07:14) (100% - 100%)    Orthostatic VS  01-25-24 @ 07:14  Lying BP: --/-- HR: --  Sitting BP: 135/79 HR: 68  Standing BP: 140/77 HR: 75  Site: upper right arm  Mode: electronic  Orthostatic VS  01-24-24 @ 07:49  Lying BP: --/-- HR: --  Sitting BP: 128/74 HR: 69  Standing BP: 114/86 HR: 81  Site: upper right arm  Mode: electronic  
Vital Signs Last 24 Hrs  T(C): 36.6 (01-31-24 @ 10:12), Max: 36.6 (01-31-24 @ 10:12)  T(F): 97.9 (01-31-24 @ 10:12), Max: 97.9 (01-31-24 @ 10:12)  HR: --  BP: --  BP(mean): --  RR: 18 (01-31-24 @ 10:12) (18 - 18)  SpO2: 100% (01-31-24 @ 10:12) (100% - 100%)    Orthostatic VS  01-31-24 @ 10:12  Lying BP: --/-- HR: --  Sitting BP: 132/79 HR: 93  Standing BP: 134/84 HR: 95  Site: upper right arm  Mode: electronic  Orthostatic VS  01-30-24 @ 07:28  Lying BP: --/-- HR: --  Sitting BP: 131/91 HR: 80  Standing BP: 127/93 HR: 93  Site: upper right arm  Mode: electronic  
Vital Signs Last 24 Hrs  T(C): 37 (01-29-24 @ 07:12), Max: 37 (01-29-24 @ 07:12)  T(F): 98.6 (01-29-24 @ 07:12), Max: 98.6 (01-29-24 @ 07:12)  HR: --  BP: --  BP(mean): --  RR: 18 (01-29-24 @ 07:12) (18 - 18)  SpO2: 100% (01-29-24 @ 07:12) (100% - 100%)    Orthostatic VS  01-29-24 @ 07:12  Lying BP: --/-- HR: --  Sitting BP: 154/89 HR: 96  Standing BP: 143/86 HR: 102  Site: upper right arm  Mode: electronic  Orthostatic VS  01-28-24 @ 07:06  Lying BP: --/-- HR: --  Sitting BP: 133/80 HR: 70  Standing BP: 129/87 HR: 80  Site: upper right arm  Mode: electronic  
Vital Signs Last 24 Hrs  T(C): 36.6 (01-31-24 @ 10:12), Max: 36.6 (01-31-24 @ 10:12)  T(F): 97.9 (01-31-24 @ 10:12), Max: 97.9 (01-31-24 @ 10:12)  HR: --  BP: --  BP(mean): --  RR: 18 (01-31-24 @ 10:12) (18 - 18)  SpO2: 100% (01-31-24 @ 10:12) (100% - 100%)    Orthostatic VS  01-31-24 @ 10:12  Lying BP: --/-- HR: --  Sitting BP: 132/79 HR: 93  Standing BP: 134/84 HR: 95  Site: upper right arm  Mode: electronic  Orthostatic VS  01-30-24 @ 07:28  Lying BP: --/-- HR: --  Sitting BP: 131/91 HR: 80  Standing BP: 127/93 HR: 93  Site: upper right arm  Mode: electronic  
Vital Signs Last 24 Hrs  T(C): 37.1 (01-27-24 @ 07:18), Max: 37.1 (01-27-24 @ 07:18)  T(F): 98.7 (01-27-24 @ 07:18), Max: 98.7 (01-27-24 @ 07:18)  HR: --  BP: --  BP(mean): --  RR: 16 (01-27-24 @ 07:18) (16 - 16)  SpO2: 100% (01-27-24 @ 07:18) (100% - 100%)    Orthostatic VS  01-27-24 @ 07:18  Lying BP: --/-- HR: --  Sitting BP: 129/83 HR: 79  Standing BP: 122/77 HR: 78  Site: upper right arm  Mode: electronic  Orthostatic VS  01-26-24 @ 06:56  Lying BP: --/-- HR: --  Sitting BP: 133/75 HR: 79  Standing BP: 131/96 HR: 90  Site: upper right arm  Mode: electronic  
Vital Signs Last 24 Hrs  T(C): 36.6 (01-31-24 @ 10:12), Max: 36.6 (01-31-24 @ 10:12)  T(F): 97.9 (01-31-24 @ 10:12), Max: 97.9 (01-31-24 @ 10:12)  HR: --  BP: --  BP(mean): --  RR: 18 (01-31-24 @ 10:12) (18 - 18)  SpO2: 100% (01-31-24 @ 10:12) (100% - 100%)    Orthostatic VS  01-31-24 @ 10:12  Lying BP: --/-- HR: --  Sitting BP: 132/79 HR: 93  Standing BP: 134/84 HR: 95  Site: upper right arm  Mode: electronic  Orthostatic VS  01-30-24 @ 07:28  Lying BP: --/-- HR: --  Sitting BP: 131/91 HR: 80  Standing BP: 127/93 HR: 93  Site: upper right arm  Mode: electronic  
Vital Signs Last 24 Hrs  T(C): 37.3 (01-28-24 @ 07:06), Max: 37.3 (01-28-24 @ 07:06)  T(F): 99.2 (01-28-24 @ 07:06), Max: 99.2 (01-28-24 @ 07:06)  HR: --  BP: --  BP(mean): --  RR: 16 (01-28-24 @ 07:06) (16 - 16)  SpO2: 100% (01-28-24 @ 07:06) (100% - 100%)    Orthostatic VS  01-28-24 @ 07:06  Lying BP: --/-- HR: --  Sitting BP: 133/80 HR: 70  Standing BP: 129/87 HR: 80  Site: upper right arm  Mode: electronic  Orthostatic VS  01-27-24 @ 07:18  Lying BP: --/-- HR: --  Sitting BP: 129/83 HR: 79  Standing BP: 122/77 HR: 78  Site: upper right arm  Mode: electronic

## 2024-02-01 NOTE — BH INPATIENT PSYCHIATRY PROGRESS NOTE - NSTXDEPRESDATEEST_PSY_ALL_CORE
24-Jan-2024

## 2024-02-01 NOTE — BH INPATIENT PSYCHIATRY PROGRESS NOTE - NSBHMSETHTCONTENT_PSY_A_CORE
Hopelessness
Unremarkable

## 2024-02-01 NOTE — BH INPATIENT PSYCHIATRY PROGRESS NOTE - NSTXCOPEDATETRGT_PSY_ALL_CORE
05-Feb-2024
07-Feb-2024
05-Feb-2024
07-Feb-2024
05-Feb-2024
07-Feb-2024
07-Feb-2024

## 2024-02-01 NOTE — BH INPATIENT PSYCHIATRY PROGRESS NOTE - NSBHASSESSSUMMFT_PSY_ALL_CORE
pt with low risk for suicide pt denies any ideation or plan   mitigating pt on medicatin   protective pt with family , family support , place to live   chronic pt with potential order of protection , marital issues

## 2024-02-01 NOTE — BH INPATIENT PSYCHIATRY PROGRESS NOTE - NSBHFUPINTERVALCCFT_PSY_A_CORE
"I'm still feeling fine "
"I feel fine , there's no issue. I know my wife told me to stay away and live with her mother for the time being. "
"I would like to stay on this dose for a day or so cause I'm feeling alright"
"I'm fine when do I get discharged  
"I'm still feeling alright ."
"Everything is just fine, I feel alright "
"I am trying to work with my wife "
"I'm doing alright "

## 2024-02-01 NOTE — BH INPATIENT PSYCHIATRY PROGRESS NOTE - NSDCCRITERIA_PSY_ALL_CORE
pt with euthymic mood , denies any suicidal ideation intent or plan 
pt with euthymic mood , denies any suicidal ideation intent or plan 
pt with euthymic mood , denies any suicidal ideatrion intent or plan 
pt with euthymic mood , denies any suicidal ideation intent or plan 

## 2024-02-01 NOTE — BH INPATIENT PSYCHIATRY PROGRESS NOTE - NSBHMSEMUSCLE_PSY_A_CORE
Normal muscle tone/strength
Unable to assess
Normal muscle tone/strength

## 2024-02-01 NOTE — BH INPATIENT PSYCHIATRY PROGRESS NOTE - NSICDXBHSECONDARYDX_PSY_ALL_CORE
ADD (attention deficit disorder)   F98.8  

## 2024-02-01 NOTE — BH INPATIENT PSYCHIATRY PROGRESS NOTE - NSTXDEPRESDATETRGT_PSY_ALL_CORE
28-Jan-2024
04-Feb-2024
28-Jan-2024
04-Feb-2024
28-Jan-2024

## 2024-02-01 NOTE — BH INPATIENT PSYCHIATRY PROGRESS NOTE - PRN MEDS
MEDICATIONS  (PRN):  dextrose Oral Gel 15 Gram(s) Oral once PRN Blood Glucose LESS THAN 70 milliGRAM(s)/deciliter  diphenhydrAMINE 50 milliGRAM(s) Oral every 6 hours PRN Extrapyramidal prophylaxis  diphenhydrAMINE 50 milliGRAM(s) Oral once PRN Extrapyramidal prophylaxis  diphenhydrAMINE Injectable 50 milliGRAM(s) IntraMuscular Once PRN Extrapyramidal prophylaxis  haloperidol     Tablet 5 milliGRAM(s) Oral every 6 hours PRN moderate psychotic agitation  haloperidol    Injectable 5 milliGRAM(s) IntraMuscular Once PRN severe psychotic agitation  
MEDICATIONS  (PRN):  dextrose Oral Gel 15 Gram(s) Oral once PRN Blood Glucose LESS THAN 70 milliGRAM(s)/deciliter  diphenhydrAMINE 50 milliGRAM(s) Oral every 6 hours PRN Extrapyramidal prophylaxis  diphenhydrAMINE 50 milliGRAM(s) Oral once PRN Extrapyramidal prophylaxis  diphenhydrAMINE Injectable 50 milliGRAM(s) IntraMuscular Once PRN Extrapyramidal prophylaxis  haloperidol     Tablet 5 milliGRAM(s) Oral every 6 hours PRN moderate psychotic agitation  haloperidol    Injectable 5 milliGRAM(s) IntraMuscular Once PRN severe psychotic agitation  
MEDICATIONS  (PRN):  dextrose Oral Gel 15 Gram(s) Oral once PRN Blood Glucose LESS THAN 70 milliGRAM(s)/deciliter  diphenhydrAMINE 50 milliGRAM(s) Oral every 6 hours PRN Extrapyramidal prophylaxis  diphenhydrAMINE Injectable 50 milliGRAM(s) IntraMuscular Once PRN Extrapyramidal prophylaxis  haloperidol     Tablet 5 milliGRAM(s) Oral every 6 hours PRN moderate psychotic agitation  haloperidol    Injectable 5 milliGRAM(s) IntraMuscular Once PRN severe psychotic agitation  
MEDICATIONS  (PRN):  dextrose Oral Gel 15 Gram(s) Oral once PRN Blood Glucose LESS THAN 70 milliGRAM(s)/deciliter  diphenhydrAMINE 50 milliGRAM(s) Oral every 6 hours PRN Extrapyramidal prophylaxis  diphenhydrAMINE Injectable 50 milliGRAM(s) IntraMuscular Once PRN Extrapyramidal prophylaxis  haloperidol     Tablet 5 milliGRAM(s) Oral every 6 hours PRN moderate psychotic agitation  haloperidol    Injectable 5 milliGRAM(s) IntraMuscular Once PRN severe psychotic agitation  
MEDICATIONS  (PRN):  dextrose Oral Gel 15 Gram(s) Oral once PRN Blood Glucose LESS THAN 70 milliGRAM(s)/deciliter  diphenhydrAMINE 50 milliGRAM(s) Oral every 6 hours PRN Extrapyramidal prophylaxis  diphenhydrAMINE 50 milliGRAM(s) Oral once PRN Extrapyramidal prophylaxis  diphenhydrAMINE Injectable 50 milliGRAM(s) IntraMuscular Once PRN Extrapyramidal prophylaxis  haloperidol     Tablet 5 milliGRAM(s) Oral every 6 hours PRN moderate psychotic agitation  haloperidol    Injectable 5 milliGRAM(s) IntraMuscular Once PRN severe psychotic agitation  
MEDICATIONS  (PRN):  dextrose Oral Gel 15 Gram(s) Oral once PRN Blood Glucose LESS THAN 70 milliGRAM(s)/deciliter  diphenhydrAMINE 50 milliGRAM(s) Oral every 6 hours PRN Extrapyramidal prophylaxis  diphenhydrAMINE Injectable 50 milliGRAM(s) IntraMuscular Once PRN Extrapyramidal prophylaxis  haloperidol     Tablet 5 milliGRAM(s) Oral every 6 hours PRN moderate psychotic agitation  haloperidol    Injectable 5 milliGRAM(s) IntraMuscular Once PRN severe psychotic agitation  

## 2024-02-02 VITALS — TEMPERATURE: 98 F | RESPIRATION RATE: 16 BRPM | OXYGEN SATURATION: 100 %

## 2024-02-02 LAB
GLUCOSE BLDC GLUCOMTR-MCNC: 135 MG/DL — HIGH (ref 70–99)
GLUCOSE BLDC GLUCOMTR-MCNC: 224 MG/DL — HIGH (ref 70–99)

## 2024-02-02 PROCEDURE — 99239 HOSP IP/OBS DSCHRG MGMT >30: CPT

## 2024-02-02 RX ORDER — INSULIN LISPRO 100/ML
9 VIAL (ML) SUBCUTANEOUS
Qty: 5 | Refills: 1
Start: 2024-02-02 | End: 2024-03-02

## 2024-02-02 RX ORDER — ESCITALOPRAM OXALATE 10 MG/1
1 TABLET, FILM COATED ORAL
Refills: 0 | DISCHARGE

## 2024-02-02 RX ORDER — NALTREXONE HYDROCHLORIDE 50 MG/1
50 TABLET, FILM COATED ORAL DAILY
Refills: 0 | Status: DISCONTINUED | OUTPATIENT
Start: 2024-02-02 | End: 2024-02-02

## 2024-02-02 RX ORDER — LAMOTRIGINE 25 MG/1
1 TABLET, ORALLY DISINTEGRATING ORAL
Refills: 0 | DISCHARGE

## 2024-02-02 RX ORDER — INSULIN ASPART 100 [IU]/ML
0 INJECTION, SOLUTION SUBCUTANEOUS
Refills: 0 | DISCHARGE

## 2024-02-02 RX ORDER — INSULIN GLARGINE 100 [IU]/ML
14 INJECTION, SOLUTION SUBCUTANEOUS
Refills: 0 | DISCHARGE

## 2024-02-02 RX ORDER — GUANFACINE 3 MG/1
1 TABLET, EXTENDED RELEASE ORAL
Qty: 30 | Refills: 1
Start: 2024-02-02 | End: 2024-03-02

## 2024-02-02 RX ORDER — CLONAZEPAM 1 MG
1 TABLET ORAL
Refills: 0 | DISCHARGE

## 2024-02-02 RX ORDER — BUPROPION HYDROCHLORIDE 150 MG/1
1 TABLET, EXTENDED RELEASE ORAL
Qty: 30 | Refills: 1
Start: 2024-02-02 | End: 2024-03-02

## 2024-02-02 RX ORDER — NALTREXONE HYDROCHLORIDE 50 MG/1
1 TABLET, FILM COATED ORAL
Refills: 0 | DISCHARGE

## 2024-02-02 RX ORDER — INSULIN GLARGINE 100 [IU]/ML
22 INJECTION, SOLUTION SUBCUTANEOUS
Qty: 1 | Refills: 1
Start: 2024-02-02 | End: 2024-03-02

## 2024-02-02 RX ORDER — NALTREXONE HYDROCHLORIDE 50 MG/1
1 TABLET, FILM COATED ORAL
Qty: 15 | Refills: 1
Start: 2024-02-02 | End: 2024-03-02

## 2024-02-02 RX ADMIN — Medication 2: at 12:25

## 2024-02-02 RX ADMIN — GUANFACINE 1 MILLIGRAM(S): 3 TABLET, EXTENDED RELEASE ORAL at 09:14

## 2024-02-02 RX ADMIN — Medication 9 UNIT(S): at 12:25

## 2024-02-02 RX ADMIN — Medication 9 UNIT(S): at 07:04

## 2024-02-02 RX ADMIN — NALTREXONE HYDROCHLORIDE 25 MILLIGRAM(S): 50 TABLET, FILM COATED ORAL at 09:13

## 2024-02-02 RX ADMIN — GUANFACINE 1 MILLIGRAM(S): 3 TABLET, EXTENDED RELEASE ORAL at 12:56

## 2024-02-02 RX ADMIN — BUPROPION HYDROCHLORIDE 150 MILLIGRAM(S): 150 TABLET, EXTENDED RELEASE ORAL at 09:13
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
## 2024-02-02 NOTE — BH INPATIENT PSYCHIATRY DISCHARGE NOTE - NSBHMETABOLIC_PSY_ALL_CORE_FT
BMI: BMI (kg/m2): 21.1 (01-23-24 @ 13:55)  HbA1c: A1C with Estimated Average Glucose Result: 11.0 % (01-23-24 @ 10:23)    Glucose: POCT Blood Glucose.: 135 mg/dL (02-02-24 @ 06:42)    BP: --Vital Signs Last 24 Hrs  T(C): 36.8 (02-02-24 @ 08:37), Max: 36.8 (02-02-24 @ 08:37)  T(F): 98.2 (02-02-24 @ 08:37), Max: 98.2 (02-02-24 @ 08:37)  HR: --  BP: --  BP(mean): --  RR: 16 (02-02-24 @ 08:37) (16 - 16)  SpO2: 100% (02-02-24 @ 08:37) (100% - 100%)    Orthostatic VS  02-02-24 @ 08:37  Lying BP: --/-- HR: --  Sitting BP: 116/75 HR: 98  Standing BP: 124/82 HR: 98  Site: upper right arm  Mode: electronic  Orthostatic VS  02-01-24 @ 07:56  Lying BP: --/-- HR: --  Sitting BP: 131/85 HR: 94  Standing BP: 117/83 HR: 97  Site: upper right arm  Mode: electronic    Lipid Panel: Date/Time: 01-23-24 @ 10:23  Cholesterol, Serum: 169  LDL Cholesterol Calculated: 85  HDL Cholesterol, Serum: 71  Total Cholesterol/HDL Ration Measurement: --  Triglycerides, Serum: 69

## 2024-02-02 NOTE — CHART NOTE - NSCHARTNOTEFT_GEN_A_CORE
Pt discharged to mother-in-law's home with aftercare scheduled at [facility]. Pt's wife provided transport to ensure safe arrival. YOVANI faxed DC summary to [facility] for continuity of care.

## 2024-02-02 NOTE — BH INPATIENT PSYCHIATRY DISCHARGE NOTE - NSDCCPCAREPLAN_GEN_ALL_CORE_FT
PRINCIPAL DISCHARGE DIAGNOSIS  Diagnosis: Depression  Assessment and Plan of Treatment:       SECONDARY DISCHARGE DIAGNOSES  Diagnosis: Attention deficit disorder (ADD)  Assessment and Plan of Treatment:     Diagnosis: Alcohol abuse, episodic  Assessment and Plan of Treatment:
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
## 2024-02-02 NOTE — BH INPATIENT PSYCHIATRY DISCHARGE NOTE - VIOLENCE RISK FACTORS:
Feeling of being under threat and being unable to control threat/Affective dysregulation/Irritability
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
## 2024-02-02 NOTE — BH INPATIENT PSYCHIATRY DISCHARGE NOTE - NSBHASSESSSUMMFT_PSY_ALL_CORE
pt is denying any suicidal ideation ,intent or plan , has plan to return to employment and to address legal issues   mitigating pt to continue with medication   protective CPS, has family and their support , has a place to live , employed  chronic pt with legal issues, hx of alcohol , noncompliance with treatment

## 2024-02-02 NOTE — BH INPATIENT PSYCHIATRY DISCHARGE NOTE - NSDCMRMEDTOKEN_GEN_ALL_CORE_FT
clonazePAM 0.5 mg oral tablet: 1 tab(s) orally 3 times a day as needed for  anxiety  escitalopram 20 mg oral tablet: 1 tab(s) orally once a day ***pt ran out recently***  Fiasp FlexTouch 100 units/mL injectable solution: Inject subcutaneously as directed as per sliding scale ***Huber states 20 units with meals - filled on 6/29-pt ran out awhile ago***  lamoTRIgine 200 mg oral tablet: 1 tab(s) orally once a day ***pt ran out recently***  naltrexone 50 mg oral tablet: 1 tab(s) orally once a day ***pt ran out a few months ago***  Toujeo SoloStar 300 units/mL subcutaneous solution: 14 unit(s) subcutaneously once a day (in the morning) ***PER HUBER- pt said he has been using an unknown amount in the morning and will occasionally use more insulin later on in the evening***   buPROPion 150 mg/24 hours (XL) oral tablet, extended release: 1 tab(s) orally 2 times a day  guanFACINE 1 mg oral tablet: 1 tab(s) orally 2 times a day  insulin glargine 100 units/mL subcutaneous solution: 22 unit(s) subcutaneous once a day (at bedtime)  insulin lispro 100 units/mL injectable solution: 9 unit(s) injectable 3 times a day  naltrexone 50 mg oral tablet: 1 tab(s) orally once a day ***pt ran out a few months ago***

## 2024-02-02 NOTE — BH INPATIENT PSYCHIATRY DISCHARGE NOTE - NSBHFUPINTERVALHXFT_PSY_A_CORE
Pt with euthymic mood and affect is full and mood congruent.  He indicated that he was wanting to return to work but also asked for a note as he was wanting to return to work on 2/12/2024 and not sooner. He claimed that he needed the time as he was relocating to his mother in laws and needing to start treatment . Pt also expressed that he was concerned that added time on sick leave would  "jeopardize his job." Informed pt that a letter to have him return to work can be given to him but suggested to him to be in touch with his job as they may count his extended time off as part of sick leave and that he may want to work something out with his employment. He is calm, not in distress , and was allowing nursing to continue with testing blood glucose before having his lunch.                  Pt denies any suicidal or homicidal ideation intent or plan . He indicated that he was planning to stay at his in laws as his wife had requested of him.

## 2024-02-04 NOTE — CHART NOTE - NSCHARTNOTEFT_GEN_A_CORE
Writer contacted patient to follow up after discharge. Spoke to patient's wife who reported he made it safely to her parent's home. Denies SI/HI.

## 2024-02-12 NOTE — CHART NOTE - NSCHARTNOTEFT_GEN_A_CORE
SW called [facility], as pt was scheduled for an aftercare appt on 2/9. Per conversation with [facility] staff, pt linked, but was seen on 2/6. Given pt linked, [employer] case to be closed.

## 2024-02-14 DIAGNOSIS — F33.1 MAJOR DEPRESSIVE DISORDER, RECURRENT, MODERATE: ICD-10-CM

## 2024-02-14 DIAGNOSIS — F98.8 OTHER SPECIFIED BEHAVIORAL AND EMOTIONAL DISORDERS WITH ONSET USUALLY OCCURRING IN CHILDHOOD AND ADOLESCENCE: ICD-10-CM

## 2024-02-14 DIAGNOSIS — F17.210 NICOTINE DEPENDENCE, CIGARETTES, UNCOMPLICATED: ICD-10-CM
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
## 2024-02-14 DIAGNOSIS — R45.851 SUICIDAL IDEATIONS: ICD-10-CM

## 2024-02-14 DIAGNOSIS — Z91.198 PATIENT'S NONCOMPLIANCE WITH OTHER MEDICAL TREATMENT AND REGIMEN FOR OTHER REASON: ICD-10-CM

## 2024-02-14 DIAGNOSIS — E10.65 TYPE 1 DIABETES MELLITUS WITH HYPERGLYCEMIA: ICD-10-CM

## 2024-02-14 DIAGNOSIS — Z79.4 LONG TERM (CURRENT) USE OF INSULIN: ICD-10-CM

## 2024-02-14 DIAGNOSIS — I10 ESSENTIAL (PRIMARY) HYPERTENSION: ICD-10-CM

## 2024-02-14 DIAGNOSIS — Z91.148 PATIENT'S OTHER NONCOMPLIANCE WITH MEDICATION REGIMEN FOR OTHER REASON: ICD-10-CM

## 2024-02-14 DIAGNOSIS — F10.10 ALCOHOL ABUSE, UNCOMPLICATED: ICD-10-CM

## 2024-02-14 DIAGNOSIS — F41.8 OTHER SPECIFIED ANXIETY DISORDERS: ICD-10-CM

## 2024-02-14 DIAGNOSIS — T43.226A UNDERDOSING OF SELECTIVE SEROTONIN REUPTAKE INHIBITORS, INITIAL ENCOUNTER: ICD-10-CM

## 2024-02-14 DIAGNOSIS — R45.6 VIOLENT BEHAVIOR: ICD-10-CM

## 2024-02-14 NOTE — CHART NOTE - NSCHARTNOTEFT_GEN_A_CORE
SW was notified by RN that pt's wife was on hold looking to speak with SW. SW spoke with pt's wife over the phone. Per wife, pt with concerns about his [facility] appt and experiencing frustration with the intake process. Per wife, pt not in a good state and she was feeling like pt may need to return to the hospital. SW requested to speak with pt. Pt got on the phone. Pt endorsed that he was having suicidal thoughts, but no plan to hurt himself. Pt denied any HI. Pt unable to tolerate lengthy conversation with SW. Wife got back on the phone. SW out wife on hold and spoke with psychiatrist, who felt pt should return to the hospital. SW spoke with wife again and advised her that pt should return to the hospital given the situation. Wife felt she could get pt to come to the hospital. Wife also inquired about DASH, which SW discussed. Wife indicated she was going to speak with pt and see if she could get him to come to the hospital. After speaking with wife, SW discussed case again with psychiatrist. Per conversation with psychiatrist, YOVANI to call police to complete wellness check. YOVANI called police and spoke with  [phone]. Per  994, police from the 2nd precinct will be dispatched. Per , YOVANI can call the 2nd precinct at [phone] to get results of the wellness check. YOVANI updated psychiatrist and psych SW supervisor.

## 2024-02-15 NOTE — CHART NOTE - NSCHARTNOTEFT_GEN_A_CORE
YOVANI called [place]nd prec[place] this morning and spoke with Officer Joanne. Per officer, wellness check was completed and at the time of wellness check pt denied any SI. Per officer, wife also denied pt was suicidal. Per officer, pt was offered DASH as a resource, which pt declined. YOVANI updated psychiatrist. Psych SW supervisor also updated. YOVANI also called pt's assigned CPS worker and updated her, as pt previously signed consent. [facility], as pt previously signed consent. [email or web address] updated. Per Liliane, pt with another follow up appt scheduled for 2/21.

## 2024-02-15 NOTE — CHART NOTE - NSCHARTNOTESELECT_GEN_ALL_CORE
24 hour follow up/Event Note
Event Note
Post-Discharge Note
24 hr. follow up call/Event Note
Event Note
Post-Discharge Note

## 2024-10-09 NOTE — ED PROVIDER NOTE - NS_ATTENDINGSCRIBE_ED_ALL_ED
"Goal Outcome Evaluation:      Plan of Care Reviewed With: patient    Overall Patient Progress: no changeOverall Patient Progress: no change    Outcome Evaluation: Care assumed from 2300 - 0700. POD2; dressing C/D/I. Soft mitts continued. Meds crushed in thickened liquid. Pain managed with tylenol and IV toradol. Sitter at bedside. Discharge TBD.      Problem: Adult Inpatient Plan of Care  Goal: Plan of Care Review  Description: The Plan of Care Review/Shift note should be completed every shift.  The Outcome Evaluation is a brief statement about your assessment that the patient is improving, declining, or no change.  This information will be displayed automatically on your shift  note.  Outcome: Progressing  Flowsheets (Taken 10/9/2024 0517)  Outcome Evaluation:   Care assumed from 2300 - 0700. POD2   dressing C/D/I. Soft mitts continued. Meds crushed in thickened liquid. Pain managed with tylenol and IV toradol. Sitter at bedside. Discharge TBD.  Plan of Care Reviewed With: patient  Overall Patient Progress: no change  Goal: Patient-Specific Goal (Individualized)  Description: You can add care plan individualizations to a care plan. Examples of Individualization might be:  \"Parent requests to be called daily at 9am for status\", \"I have a hard time hearing out of my right ear\", or \"Do not touch me to wake me up as it startles  me\".  Outcome: Progressing  Goal: Absence of Hospital-Acquired Illness or Injury  Outcome: Progressing  Intervention: Identify and Manage Fall Risk  Recent Flowsheet Documentation  Taken 10/9/2024 0021 by Merly Orozco, RN  Safety Promotion/Fall Prevention:   bedside attendant   room near nurse's station   safety round/check completed  Intervention: Prevent Skin Injury  Recent Flowsheet Documentation  Taken 10/9/2024 0021 by Merly Orozco, RN  Body Position: weight shifting  Skin Protection:   adhesive use limited   incontinence pads utilized   mittens applied to " hands  Device Skin Pressure Protection:   absorbent pad utilized/changed   adhesive use limited   mittens applied to hands   positioning supports utilized  Intervention: Prevent and Manage VTE (Venous Thromboembolism) Risk  Recent Flowsheet Documentation  Taken 10/9/2024 0021 by Merly Orozco RN  VTE Prevention/Management: SCDs on (sequential compression devices)  Intervention: Prevent Infection  Recent Flowsheet Documentation  Taken 10/9/2024 0021 by Merly Orozco RN  Infection Prevention:   single patient room provided   rest/sleep promoted   hand hygiene promoted  Goal: Optimal Comfort and Wellbeing  Outcome: Progressing  Goal: Readiness for Transition of Care  Outcome: Progressing     Problem: Pain Acute  Goal: Optimal Pain Control and Function  Outcome: Progressing  Intervention: Prevent or Manage Pain  Recent Flowsheet Documentation  Taken 10/9/2024 0021 by Merly Orozco RN  Bowel Elimination Promotion: adequate fluid intake promoted  Medication Review/Management: medications reviewed  Intervention: Optimize Psychosocial Wellbeing  Recent Flowsheet Documentation  Taken 10/9/2024 0021 by Merly Orozco RN  Supportive Measures: relaxation techniques promoted     Problem: Orthopaedic Fracture  Goal: Absence of Bleeding  Outcome: Progressing  Intervention: Monitor and Manage Fracture Bleeding  Recent Flowsheet Documentation  Taken 10/9/2024 0021 by Merly Orozco RN  Bleeding Management: dressing monitored  Goal: Bowel Elimination  Outcome: Progressing  Intervention: Promote Effective Bowel Elimination  Recent Flowsheet Documentation  Taken 10/9/2024 0021 by Merly Orozco RN  Bowel Elimination Promotion: adequate fluid intake promoted  Goal: Absence of Embolism Signs and Symptoms  Outcome: Progressing  Intervention: Prevent or Manage Embolism Risk  Recent Flowsheet Documentation  Taken 10/9/2024 0021 by Merly Orozco RN  VTE  Prevention/Management: SCDs on (sequential compression devices)  Goal: Fracture Stability  Outcome: Progressing  Goal: Optimal Functional Ability  Outcome: Progressing  Intervention: Optimize Functional Ability  Recent Flowsheet Documentation  Taken 10/9/2024 0021 by Merly Orozco RN  Self-Care Promotion:   BADL personal objects within reach   meal set-up provided   BADL personal routines maintained   adaptive equipment use encouraged  Positioning/Transfer Devices:   pillows   in use  Goal: Absence of Infection Signs and Symptoms  Outcome: Progressing  Intervention: Prevent or Manage Infection  Recent Flowsheet Documentation  Taken 10/9/2024 0021 by Merly Orozco RN  Infection Management: aseptic technique maintained  Goal: Effective Tissue Perfusion  Outcome: Progressing  Goal: Optimal Pain Control and Function  Outcome: Progressing  Goal: Effective Oxygenation and Ventilation  Outcome: Progressing  Intervention: Promote Airway Secretion Clearance  Recent Flowsheet Documentation  Taken 10/9/2024 0021 by Merly Orozco RN  Cough And Deep Breathing: unable to perform  Intervention: Optimize Oxygenation and Ventilation  Recent Flowsheet Documentation  Taken 10/9/2024 0021 by Merly Orozco RN  Head of Bed (HOB) Positioning: HOB at 20-30 degrees     Problem: Restraint, Nonviolent  Goal: Absence of Harm or Injury  Outcome: Progressing  Intervention: Protect Skin and Joint Integrity  Recent Flowsheet Documentation  Taken 10/9/2024 0021 by Merly Orozco RN  Body Position: weight shifting  Skin Protection:   adhesive use limited   incontinence pads utilized   mittens applied to hands     Problem: Hip Arthroplasty  Goal: Optimal Coping  Outcome: Progressing  Intervention: Support Psychosocial Response to Surgery and Mobility Changes  Recent Flowsheet Documentation  Taken 10/9/2024 0021 by Merly Orozco RN  Supportive Measures: relaxation techniques  promoted  Goal: Absence of Bleeding  Outcome: Progressing  Intervention: Monitor and Manage Bleeding  Recent Flowsheet Documentation  Taken 10/9/2024 0021 by Merly Orozco RN  Bleeding Management: dressing monitored  Goal: Effective Bowel Elimination  Outcome: Progressing  Goal: Fluid and Electrolyte Balance  Outcome: Progressing  Goal: Optimal Functional Ability  Outcome: Progressing  Intervention: Promote Optimal Functional Status  Recent Flowsheet Documentation  Taken 10/9/2024 0021 by Merly Orozco RN  Self-Care Promotion:   BADL personal objects within reach   meal set-up provided   BADL personal routines maintained   adaptive equipment use encouraged  Goal: Absence of Infection Signs and Symptoms  Outcome: Progressing  Intervention: Prevent or Manage Infection  Recent Flowsheet Documentation  Taken 10/9/2024 0021 by Merly Orozco RN  Infection Management: aseptic technique maintained  Goal: Intact Neurovascular Status  Outcome: Progressing  Goal: Anesthesia/Sedation Recovery  Outcome: Progressing  Intervention: Optimize Anesthesia Recovery  Recent Flowsheet Documentation  Taken 10/9/2024 0021 by Merly Orozco RN  Safety Promotion/Fall Prevention:   bedside attendant   room near nurse's station   safety round/check completed  Goal: Acceptable Pain Control  Outcome: Progressing  Goal: Nausea and Vomiting Relief  Outcome: Progressing  Goal: Effective Urinary Elimination  Outcome: Progressing  Goal: Effective Oxygenation and Ventilation  Outcome: Progressing  Intervention: Optimize Oxygenation and Ventilation  Recent Flowsheet Documentation  Taken 10/9/2024 0021 by Merly Orozco RN  Head of Bed (HOB) Positioning: HOB at 20-30 degrees      I personally performed the service described in the documentation recorded by the scribe in my presence, and it accurately and completely records my words and actions.

## 2025-02-11 ENCOUNTER — NON-APPOINTMENT (OUTPATIENT)
Age: 45
End: 2025-02-11

## 2025-02-13 ENCOUNTER — NON-APPOINTMENT (OUTPATIENT)
Age: 45
End: 2025-02-13

## 2025-02-18 ENCOUNTER — NON-APPOINTMENT (OUTPATIENT)
Age: 45
End: 2025-02-18

## 2025-02-21 ENCOUNTER — NON-APPOINTMENT (OUTPATIENT)
Age: 45
End: 2025-02-21

## 2025-02-27 ENCOUNTER — NON-APPOINTMENT (OUTPATIENT)
Age: 45
End: 2025-02-27

## 2025-05-12 ENCOUNTER — NON-APPOINTMENT (OUTPATIENT)
Age: 45
End: 2025-05-12